# Patient Record
Sex: FEMALE | Race: WHITE
[De-identification: names, ages, dates, MRNs, and addresses within clinical notes are randomized per-mention and may not be internally consistent; named-entity substitution may affect disease eponyms.]

---

## 2023-05-24 ENCOUNTER — HOSPITAL ENCOUNTER (INPATIENT)
Dept: HOSPITAL 97 - ER | Age: 63
LOS: 2 days | Discharge: HOME | DRG: 379 | End: 2023-05-26
Attending: HOSPITALIST | Admitting: HOSPITALIST
Payer: COMMERCIAL

## 2023-05-24 VITALS — BODY MASS INDEX: 16.9 KG/M2

## 2023-05-24 DIAGNOSIS — K27.4: ICD-10-CM

## 2023-05-24 DIAGNOSIS — J44.9: ICD-10-CM

## 2023-05-24 DIAGNOSIS — K29.01: Primary | ICD-10-CM

## 2023-05-24 DIAGNOSIS — Z79.899: ICD-10-CM

## 2023-05-24 DIAGNOSIS — D12.4: ICD-10-CM

## 2023-05-24 DIAGNOSIS — F17.210: ICD-10-CM

## 2023-05-24 DIAGNOSIS — Z90.710: ICD-10-CM

## 2023-05-24 DIAGNOSIS — Z88.5: ICD-10-CM

## 2023-05-24 DIAGNOSIS — M19.90: ICD-10-CM

## 2023-05-24 LAB
ALBUMIN SERPL BCP-MCNC: 3.7 G/DL (ref 3.4–5)
ALP SERPL-CCNC: 61 U/L (ref 45–117)
ALT SERPL W P-5'-P-CCNC: 23 U/L (ref 13–56)
AST SERPL W P-5'-P-CCNC: 15 U/L (ref 15–37)
BUN BLD-MCNC: 18 MG/DL (ref 7–18)
GLUCOSE SERPLBLD-MCNC: 94 MG/DL (ref 74–106)
HCT VFR BLD CALC: 38.9 % (ref 36–45)
LIPASE SERPL-CCNC: 77 U/L (ref 13–75)
LYMPHOCYTES # SPEC AUTO: 0.9 K/UL (ref 0.7–4.9)
MCV RBC: 93.2 FL (ref 80–100)
PMV BLD: 5.9 FL (ref 7.6–11.3)
POTASSIUM SERPL-SCNC: 3.9 MEQ/L (ref 3.5–5.1)
RBC # BLD: 4.17 M/UL (ref 3.86–4.86)

## 2023-05-24 PROCEDURE — 93005 ELECTROCARDIOGRAM TRACING: CPT

## 2023-05-24 PROCEDURE — 88305 TISSUE EXAM BY PATHOLOGIST: CPT

## 2023-05-24 PROCEDURE — 85025 COMPLETE CBC W/AUTO DIFF WBC: CPT

## 2023-05-24 PROCEDURE — 96361 HYDRATE IV INFUSION ADD-ON: CPT

## 2023-05-24 PROCEDURE — 74177 CT ABD & PELVIS W/CONTRAST: CPT

## 2023-05-24 PROCEDURE — 96374 THER/PROPH/DIAG INJ IV PUSH: CPT

## 2023-05-24 PROCEDURE — 80048 BASIC METABOLIC PNL TOTAL CA: CPT

## 2023-05-24 PROCEDURE — 86900 BLOOD TYPING SEROLOGIC ABO: CPT

## 2023-05-24 PROCEDURE — 99284 EMERGENCY DEPT VISIT MOD MDM: CPT

## 2023-05-24 PROCEDURE — 74250 X-RAY XM SM INT 1CNTRST STD: CPT

## 2023-05-24 PROCEDURE — 85018 HEMOGLOBIN: CPT

## 2023-05-24 PROCEDURE — 88312 SPECIAL STAINS GROUP 1: CPT

## 2023-05-24 PROCEDURE — 80053 COMPREHEN METABOLIC PANEL: CPT

## 2023-05-24 PROCEDURE — 36415 COLL VENOUS BLD VENIPUNCTURE: CPT

## 2023-05-24 PROCEDURE — 86850 RBC ANTIBODY SCREEN: CPT

## 2023-05-24 PROCEDURE — 86901 BLOOD TYPING SEROLOGIC RH(D): CPT

## 2023-05-24 PROCEDURE — 96375 TX/PRO/DX INJ NEW DRUG ADDON: CPT

## 2023-05-24 PROCEDURE — 83690 ASSAY OF LIPASE: CPT

## 2023-05-24 RX ADMIN — Medication SCH: at 21:56

## 2023-05-24 NOTE — XMS REPORT
Continuity of Care Document

                             Created on:May 24, 2023



Patient:JHONNY TY

Sex:Female

:1960

External Reference #:[id number]





Demographics







                          Address                   [address] [place] [address]

 

                          Home Phone                [phone]

 

                          Work Phone                [phone]

 

                          Mobile Phone              [phone]

 

                          Email Address             [email or web address]

 

                          Preferred Language        English

 

                          Marital Status            Unknown

 

                          Druze Affiliation     Unknown

 

                          Race                      Unknown

 

                          Additional Race(s)        Unavailable



                                                    White

 

                          Ethnic Group              Unknown









Author







                          Organization              [facility]

t

 

                          Address                   [address] [place] [address] [address] [place] [address]

 

                          Phone                     [phone]









Support







                Name            Relationship    Address         Phone

 

                [place]  Spouse          Unavailable     [phone]









Care Team Providers







                    Name                Role                Phone

 

                    Asked, No Pcp       Primary Care Physician Unavailable

 

                    Dionicio CRAWLEY, Sergio HOLLIS Attending Clinician [phone]

[phone]

 

                    Serjio CRAWLEY, Oswaldomarie Olmedo Attending Clinician [phone]

 

                    Al CRAWLEY, Liz Gaona Attending Clinician [phone]

 

                    Doctor Unassigned, No Name Attending Clinician Unavailable

 

                    YENI FIREDMAN      Attending Clinician Unavailable

 

                    Neurology           Attending Clinician Unavailable

 

                    JUANITO LERNER Attending Clinician Unavailable

 

                    Juanito Lerner MD Attending Clinician [phone]

 

                    Lab, Adc Fam Pob I  Attending Clinician Unavailable

 

                    Sandy Greco  Attending Clinician [phone]

 

                    SANDY CIFUENTES      Attending Clinician Unavailable

 

                    OSWALDO BASSETT      Admitting Clinician Unavailable









Payers







           Payer Name Policy Type Policy Number Effective Date Expiration Date S

ource







Problems







       Condition Condition Condition Status Onset  Resolution Last   Treating Co

mments 

Source



       Name   Details Category        Date   Date   Treatment Clinician        



                                                 Date                 

 

       Solitary Solitary Disease Active                              Metho

di



       pulmonary pulmonary               



       nodule nodule               00:00:                             Hosp[facility]



                                   00                                 l

 

       Pulmonary Pulmonary Disease Active                              Met

hodi



       emphysema emphysema               



                                   00:00:                             Hospita



                                   00                                 l

 

       Depression Depression Disease Active                              M

ethodi



                                   



                                   00:00:                             Hospita



                                   00                                 l

 

       Arthritis Arthritis Disease Active                              Met

hodi



                                                                  st



                                   00:00:                             Hospita



                                   00                                 l

 

       Anxiety Anxiety Disease Active                              [facility]



                                   6-22                               st



                                   00:00:                             Hospita



                                   00                                 l

 

       Neuropathy Neuropathy Disease Active                              U

nivers



       of both of both               9-16                               ity of



       feet   feet                 00:00:                             [address]

 

       Neuropathy Neuropathy Disease Active                              M

ethodi



       of both of both               9-16                               st



       feet   feet                 00:00:                             Hospita



                                   00                                 l

 

       COPD   COPD   Disease Active                                    [facility]



       (chronic (chronic                                                  [facility]



       obstructiv obstructiv                                                  [facility]

[facility]



       e      e                                                       [facility]



       pulmonary pulmonary                                                  [facility]

ch



       disease) disease)                                                  

 

       Fever, Fever, Diagnosis Active                                    Common



       unspecifie unspecifie                                                  Sp

juan



       d fever d fever                                                  - [facility]



       cause  cause                                                   [facility]

 

       Pulmonary Pulmonary Diagnosis Active                                    C

ommon



       emphysema, emphysema,                                                  Sp

juan



       unspecifie unspecifie                                                  - 

[facility]



       d      d                                                       St



       emphysema emphysema                                                  [facility]

s



       type   type                                                    Medical



                                                                      Center

 

       Cough  Cough  Diagnosis Active                                    Common



                                                                      Spirit



                                                                      - [facility]



                                                                      [facility]







Allergies, Adverse Reactions, Alerts







       Allergy Allergy Status Severity Reaction(s) Onset  Inactive Treating Comm

ents 

Source



       Name   Type                        Date   Date   Clinician        

 

       NO KNOWN Drug   Active                                           [facility]



       ALLERGIE Class                                                   ity of



       S                                                              [facility]







Social History







           Social Habit Start Date Stop Date  Quantity   Comments   Source

 

           Exposure to                       Yes                   [facility]



           SARS-CoV-2 (event)                                             [facility]

 

           History SDOH                                             [facility]

f



           Alcohol Std Drinks                                             [place] 

[facility]



                                                                  [facility]

 

           History [facility]                                             [facility]

f



           Alcohol Binge                                             [place] [facility]

al



                                                                  [facility]

 

           History of tobacco                       Smokes tobacco            Me

[facility]



           use                              daily                 Hospital

 

           Gender identity                                             [facility]



                                                                  [facility]

 

           Sexual orientation                                             [facility]

ist



                                                                  [facility]ospital

 

           Alcohol intake 2022 Ex-drinker            [facility]



                      00:00:00   00:00:00   (finding)             Hospital

 

           History of Social 2022                       [facility]

st



           function   00:00:00   00:00:00                         Hospital

 

           Cigarettes smoked 2022                       [facility]

st



           current (pack per 00:00:00   00:00:00                         Hospita

l



           day) - Reported                                             

 

           Cigarette  2022                       [facility]



           pack-years 00:00:00   00:00:00                         Hospital

 

           Tobacco use and 2022 Smokeless             [facility]



           exposure   00:00:00   00:00:00   tobacco non-user            Hospital

 

           History SDOH 2019 1                     [facility]

f



           Alcohol Frequency 00:00:00   00:00:00                         [place] [facility]

[facility]



                                                                  [facility]

 

           Sex Assigned At 1960                       [facility]



           Birth      00:00:00   00:00:00                         Hospital









                Smoking Status  Start Date      Stop Date       Source

 

                Smokes tobacco daily 2022 00:00:00                 [facility]

 

                Unknown if ever smoked                                 [facility]







Medications







       Ordered Filled Start  Stop   Current Ordering Indication Dosage Frequency

 Signature

                    Comments            Components          Source



     Medication Medication Date Date Medication? Clinician                (SIG) 

          



     Name Name                                                   

 

     levoFLOXaci      2022- No             750mg QD   Take 1           Me

thodi



     n         6-26 07-04                          tablet           st



     (Levaquin)      00:00: 04:59                          (750 mg           Hos

claudine



     750 MG      00   :00                           total) by           l



     tablet                                         mouth           



                                                  daily for           



                                                  7 days.           

 

     Trelegy            Yes            1{each} QD   Inhale 1           Met

hodi



     Ellipta      4-23                               each           st



     100-62.5-25      00:00:                               daily.           Hosp

tanmay



     mcg blister      00                                                l



     with device                                                        



     powder for                                                        



     inhalation                                                        

 

     azithromyci      2022- No             250mg QD   Take 250           

[facility]



     n         3-30 06-26                          mg by           st



     (ZITHROMAX)      00:00: 00:00                          mouth           Hosp

tanmay



     250 MG      00   :00                           daily.           l



     tablet                                                        

 

     albuterol            Yes            1{puff}      Inhale 1           M

ethodi



     (PROAIR      3-18                               puff as           st



     HFA) 90      00:00:                               needed.           Hospita



     mcg/actuati      00                                                l



     on inhaler                                                        

 

     ALBUTEROL            Yes       [id number]           INHALE TWO         

  Univers



     90        2-13                               (2) PUFFS           ity of



     mcg/actuati      00:00:                               BY MOUTH           [facility]

[facility]



     on inhaler      00                                 EVERY 4           Medica

l



                                                  HOURS AS           Branch



                                                  NEEDED FOR           



                                                  WHEEZING           



                                                  OR             



                                                  SHORTNESS           



                                                  OF BREATH.           

 

     ALBUTEROL            Yes       [id number]           INHALE TWO         

  [facility]



     90        2-13                               (2) PUFFS           ity of



     mcg/actuati      00:00:                               BY MOUTH           Te

[facility]



     on inhaler      00                                 EVERY 4           Medica

l



                                                  HOURS AS           Branch



                                                  NEEDED FOR           



                                                  WHEEZING           



                                                  OR             



                                                  SHORTNESS           



                                                  OF BREATH.           

 

     ALBUTEROL            Yes       [id number]           INHALE TWO         

  Univers



     90        2-13                               (2) PUFFS           ity of



     mcg/actuati      00:00:                               BY MOUTH           Te

[facility]



     on inhaler      00                                 EVERY 4           Medica

l



                                                  HOURS AS           Branch



                                                  NEEDED FOR           



                                                  WHEEZING           



                                                  OR             



                                                  SHORTNESS           



                                                  OF BREATH.           

 

     ALBUTEROL      2020-0      Yes       [id number]           INHALE TWO         

  Univers



     90        2-13                               (2) PUFFS           ity of



     mcg/actuati      00:00:                               BY MOUTH           Te

[facility]



     on inhaler      00                                 EVERY 4           Medica

l



                                                  HOURS AS           Branch



                                                  NEEDED FOR           



                                                  WHEEZING           



                                                  OR             



                                                  SHORTNESS           



                                                  OF BREATH.           

 

     ALBUTEROL            Yes       [id number]           INHALE TWO         

  [facility]



     90        2-13                               (2) PUFFS           ity of



     mcg/actuati      00:00:                               BY MOUTH           Te

[facility]



     on inhaler      00                                 EVERY 4           Medica

l



                                                  HOURS AS           Branch



                                                  NEEDED FOR           



                                                  WHEEZING           



                                                  OR             



                                                  SHORTNESS           



                                                  OF BREATH.           

 

     ALBUTEROL            Yes       [id number]           INHALE TWO         

  Univers



     90        2-13                               (2) PUFFS           ity of



     mcg/actuati      00:00:                               BY MOUTH           Te

[facility]



     on inhaler      00                                 EVERY 4           Medica

l



                                                  HOURS AS           Branch



                                                  NEEDED FOR           



                                                  WHEEZING           



                                                  OR             



                                                  SHORTNESS           



                                                  OF BREATH.           

 

     albuterol-i      2019      Yes            1{puff}      Inhale 1          

 [facility]



     pratropium      1-19                               Puff 4           ity of



           20:45:                               (four)           [place]



     mcg/actuati      33                                 times           Medical



     on inhaler                                         daily.           Branch

 

     albuterol-i      2019      Yes            1{puff}      Inhale 1          

 [facility]



     pratropium      1-19                               Puff 4           ity of



           20:45:                               (four)           [place]



     mcg/actuati      33                                 times           Medical



     on inhaler                                         daily.           Branch

 

     albuterol-i      2019      Yes            1{puff}      Inhale 1          

 [facility]



     pratropium      1-19                               Puff 4           ity of



           20:45:                               (four)           [place]



     mcg/actuati      33                                 times           Medical



     on inhaler                                         daily.           Branch

 

     albuterol-i      2019      Yes            1{puff}      Inhale 1          

 [facility]



     pratropium      1-19                               Puff 4           ity of



           20:45:                               (four)           [place]



     mcg/actuati      33                                 times           Medical



     on inhaler                                         daily.           Branch

 

     albuterol-i      2019      Yes            1{puff}      Inhale 1          

 [facility]



     pratropium      1-19                               Puff 4           ity of



           20:45:                               (four)           [place]



     mcg/actuati      33                                 times           Medical



     on inhaler                                         daily.           Branch

 

     albuterol-i      2019      Yes            1{puff}      Inhale 1          

 [facility]



     pratropium      1-19                               Puff 4           ity of



           20:45:                               (four)           [place]



     mcg/actuati      33                                 times           Medical



     on inhaler                                         daily.           Branch

 

     venlafaxine            Yes       [id number] 37.5mg      Take 1        

   [facility]



     XR (EFFEXOR      9-24                               capsule by           it

y of



     XR) 37.5 mg      00:00:                               mouth           [place]



     24 hr      00                                 daily with           Medical



     capsule                                         breakfast.           Branch



                                                  STOP           



                                                  CITALOPRAM           



                                                  .              

 

     venlafaxine            Yes       [id number] 37.5mg      Take 1        

   [facility]



     XR (EFFEXOR      9-24                               capsule by           it

y of



     XR) 37.5 mg      00:00:                               mouth           [place]



     24 hr      00                                 daily with           Medical



     capsule                                         breakfast.           Branch



                                                  STOP           



                                                  CITALOPRAM           



                                                  .              

 

     venlafaxine            Yes       [id number] 37.5mg      Take 1        

   [facility]



     XR (EFFEXOR      9-24                               capsule by           it

y of



     XR) 37.5 mg      00:00:                               mouth           [place]



     24 hr      00                                 daily with           Medical



     capsule                                         breakfast.           Branch



                                                  STOP           



                                                  CITALOPRAM           



                                                  .              

 

     venlafaxine            Yes       [id number] 37.5mg      Take 1        

   [facility]



     XR (EFFEXOR      9-24                               capsule by           it

y of



     XR) 37.5 mg      00:00:                               mouth           [place]



     24 hr      00                                 daily with           Medical



     capsule                                         breakfast.           Branch



                                                  STOP           



                                                  CITALOPRAM           



                                                  .              

 

     venlafaxine            Yes       [id number] 37.5mg      Take 1        

   [facility]



     XR (EFFEXOR      9-24                               capsule by           it

y of



     XR) 37.5 mg      00:00:                               mouth           [place]



     24 hr      00                                 daily with           Medical



     capsule                                         breakfast.           Branch



                                                  STOP           



                                                  CITALOPRAM           



                                                  .              

 

     venlafaxine            Yes       [id number] 37.5mg      Take 1        

   [facility]



     XR (EFFEXOR      9-24                               capsule by           it

y of



     XR) 37.5 mg      00:00:                               mouth           [place]



     24 hr      00                                 daily with           Medical



     capsule                                         breakfast.           Branch



                                                  STOP           



                                                  CITALOPRAM           



                                                  .              

 

     albuterol       2020- No        [id number] 2{puff}      Inhale 2       

    Univers



     90        924 02-13                          Puffs           ity of



     mcg/actuati      00:00: 00:00                          every 4           Te

[facility]



     on inhaler      00   :00                           (four)           Medical



                                                  hours as           Branch



                                                  needed for           



                                                  Wheezing           



                                                  or             



                                                  Shortness           



                                                  of Breath.           

 

     lidocaine            Yes       [id number] 5mL       Take 5 mL         

  Univers



     2% viscous      9-21                               by mouth           [facility]

f



     (LIDOCAINE      00:00:                               every 4           [facility]

[facility]



     VISCOUS) 2      00                                 (four)           [facility]



     % solution                                         hours as           [facility]

[facility]



                                                  needed for           



                                                  Local           



                                                  anesthesia           



                                                  .              

 

     lidocaine            Yes       [id number] 5mL       Take 5 mL         

  [facility]



     2% viscous      9-21                               by mouth           [facility]

f



     (LIDOCAINE      00:00:                               every 4           [facility]

[facility]



     VISCOUS) 2      00                                 (four)           [facility]



     % solution                                         hours as           [facility]

h



                                                  needed for           



                                                  Local           



                                                  anesthesia           



                                                  .              

 

     lidocaine      2019-0      Yes       [id number] 5mL       Take 5 mL         

  [facility]



     2% viscous      9-21                               by mouth           [facility]

f



     (LIDOCAINE      00:00:                               every 4           [facility]

[facility]



     VISCOUS) 2      00                                 (four)           [facility]



     % solution                                         hours as           [facility]

h



                                                  needed for           



                                                  Local           



                                                  anesthesia           



                                                  .              

 

     lidocaine      2019-0      Yes       [id number] 5mL       Take 5 mL         

  [facility]



     2% viscous      9-21                               by mouth           [facility]

f



     (LIDOCAINE      00:00:                               every 4           [facility]

[facility]



     VISCOUS) 2      00                                 (four)           [facility]



     % solution                                         hours as           [facility]

h



                                                  needed for           



                                                  Local           



                                                  anesthesia           



                                                  .              

 

     lidocaine      2019-0      Yes       [id number] 5mL       Take 5 mL         

  [facility]



     2% viscous      9-21                               by mouth           [facility]

f



     (LIDOCAINE      00:00:                               every 4           [facility]

[facility]



     VISCOUS) 2      00                                 (four)           [facility]



     % solution                                         hours as           [facility]

h



                                                  needed for           



                                                  Local           



                                                  anesthesia           



                                                  .              

 

     lidocaine      -0      Yes       [id number] 5mL       Take 5 mL         

  [facility]



     2% viscous      9-21                               by mouth           [facility]

f



     (LIDOCAINE      00:00:                               every 4           [facility]

[facility]



     VISCOUS) 2      00                                 (four)           [facility]



     % solution                                         hours as           [facility]

h



                                                  needed for           



                                                  Local           



                                                  anesthesia           



                                                  .              

 

     gabapentin      2019-0      Yes       [id number] 100mg      Take 1          

 [facility]



     100 mg      9-05                               capsule by           [facility]



     capsule      00:00:                               mouth 3           [place]



               00                                 (three)           [facility]



                                                  times           [facility]



                                                  daily.           

 

     gabapentin      2019-0      Yes                                     [facility]



     100 mg      9-05                                              [facility]



     capsule      00:00:                                              [place]



                                                               [facility]



                                                                 [facility]

 

     citalopram      2019-0      Yes                                     [facility]



     10 mg      9-05                                              [facility]



     tablet      00:00:                                              [place]



               00                                                [facility]



                                                                 [facility]

 

     gabapentin      2019-0      Yes       [id number] 100mg      Take 1          

 [facility]



     100 mg      9-05                               capsule by           [facility]



     capsule      00:00:                               mouth 3           [place]



               00                                 (three)           [facility]



                                                  times           [facility]



                                                  daily.           

 

     gabapentin      2019-0      Yes                                     [facility]



     100 mg      9-05                                              ity [facility]



     capsule      00:00:                                              [place]



                                                               [facility]



                                                                 [facility]

 

     citalopram      2019-0      Yes                                     [facility]



     10 mg      9-05                                              ity [facility]



     tablet      00:00:                                              [place]



               00                                                [facility]



                                                                 [facility]

 

     gabapentin      2019-0      Yes       [id number] 100mg      Take 1          

 [facility]



     100 mg      9-05                               capsule by           [facility]



     capsule      00:00:                               mouth 3           [place]



               00                                 (three)           [facility]



                                                  times           [facility]



                                                  daily.           

 

     gabapentin      2019-0      Yes                                     [facility]



     100 mg      9-05                                              ity [facility]



     capsule      00:00:                                              [place]



               00                                                [facility]



                                                                 [facility]

 

     citalopram      2019-0      Yes                                     [facility]



     10 mg      9-05                                              ity [facility]



     tablet      00:00:                                              [place]



               00                                                [facility]



                                                                 [facility]

 

     gabapentin      2019-0      Yes       [id number] 100mg      Take 1          

 [facility]



     100 mg      9-05                               capsule by           [facility]



     capsule      00:00:                               mouth 3           [place]



               00                                 (three)           [facility]



                                                  times           [facility]



                                                  daily.           

 

     citalopram      2019-0      Yes                                     [facility]



     10 mg      9-05                                              [facility]



     tablet      00:00:                                              [place]



               00                                                [facility]



                                                                 [facility]

 

     gabapentin      2019-0      Yes       [id number] 100mg      Take 1          

 [facility]



     100 mg      9-05                               capsule by           [facility]



     capsule      00:00:                               mouth 3           [place]



               00                                 (three)           [facility]



                                                  times           [facility]



                                                  daily.           

 

     citalopram      2019-0      Yes                                     [facility]



     10 mg      9-05                                              ity [facility]



     tablet      00:00:                                              [place]



               00                                                [facility]



                                                                 [facility]

 

     gabapentin      2019-0      Yes       [id number] 100mg      Take 1          

 [facility]



     100 mg      9-05                               capsule by           [facility]



     capsule      00:00:                               mouth 3           [place]



               00                                 (three)           [facility]



                                                  times           [facility]



                                                  daily.           

 

     citalopram      2019-0      Yes                                     [facility]



     10 mg      9-05                                              [facility]



     tablet      00:00:                                              [place]



               00                                                [facility]



                                                                 [facility]

 

     gabapentin      2019-0      Yes       [id number] 100mg      Take 1          

 [facility]



     100 mg      9-05                               capsule by           [facility]



     capsule      00:00:                               mouth 3           [place]



               00                                 (three)           [facility]



                                                  times           [facility]



                                                  daily.           

 

     citalopram      2019-0      Yes       [id number] 10mg      Take 1           

[facility]



     10 mg      9-05                               tablet by           [facility]



     tablet      00:00:                               mouth           [place]



               00                                 daily.           [facility]



                                                                 [facility]

 

     gabapentin      2019-0      Yes       [id number] 100mg      Take 1          

 [facility]



     100 mg      9-05                               capsule by           [facility]



     capsule      00:00:                               mouth 3           [place]



               00                                 (three)           [facility]



                                                  times           [facility]



                                                  daily.           

 

     citalopram      2019-0      Yes       [id number] 10mg      Take 1           

[facility]



     10 mg      9-05                               tablet by           [facility]



     tablet      00:00:                               mouth           [place]



               00                                 daily.           [facility]



                                                                 [facility]

 

     gabapentin      2019-0      Yes       [id number] 100mg      Take 1          

 [facility]



     100 mg      9-05                               capsule by           [facility]



     capsule      00:00:                               mouth 3           [place]



               00                                 (three)           [facility]



                                                  times           [facility]



                                                  daily.           

 

     citalopram      2019-0      Yes       [id number] 10mg      Take 1           

[facility]



     10 mg      9-05                               tablet by           [facility]



     tablet      00:00:                               mouth           [place]



               00                                 daily.           [facility]



                                                                 [facility]

 

     gabapentin      2019-0      Yes       [id number] 100mg      Take 1          

 [facility]



     100 mg      9-05                               capsule by           [facility]



     capsule      00:00:                               mouth 3           [place]



               00                                 (three)           [facility]



                                                  times           [facility]



                                                  daily.           

 

     citalopram      2019-      Yes       [id number] 10mg      Take 1           

[facility]



     10 mg      9-05                               tablet by           [facility]



     tablet      00:00:                               mouth           [place]



               00                                 daily.           [facility]



                                                                 [facility]

 

     gabapentin      - 2020- No                                      [facility]

[facility]



     100 mg      9-05 11-10                                         [facility]



     capsule      00:00: 00:00                                         [place]



               00   :00                                          [facility]



                                                                 [facility]

 

     STIOLTO      2019-      Yes                      INHALE TWO           [facility]

ers



     RESPIMAT      6-21                               (2)            [facility]



     2.5-2.5      00:00:                               PUFF(S) BY           [facility]

[facility]



     mcg/actuati      00                                 MOUTH           [facility]



     on Mist                                         EVERY 24           [facility]



                                                  HOURS.           

 

     STIOLTO            Yes                      INHALE TWO           [facility]

ers



     RESPIMAT      6-21                               (2)            [facility]



     2.5-2.5      00:00:                               PUFF(S) BY           [facility]

[facility]



     mcg/actuati      00                                 MOUTH           [facility]



     on Mist                                         EVERY 24           [facility]



                                                  HOURS.           

 

     STIOLTO            Yes                      INHALE TWO           [facility]

ers



     RESPIMAT      6-21                               (2)            [facility]



     2.5-2.5      00:00:                               PUFF(S) BY           [facility]

[facility]



     mcg/actuati      00                                 MOUTH           [facility]



     on Mist                                         EVERY 24           [facility]



                                                  HOURS.           

 

     STIOLTO            Yes                      INHALE TWO           [facility]

ers



     RESPIMAT      6-21                               (2)            [facility]



     2.5-2.5      00:00:                               PUFF(S) BY           [facility]

[facility]



     mcg/actuati      00                                 MOUTH           [facility]



     on Mist                                         EVERY 24           [facility]



                                                  HOURS.           

 

     STIOLTO            Yes                      INHALE TWO           [facility]

ers



     RESPIMAT      6-21                               (2)            [facility]



     2.5-2.5      00:00:                               PUFF(S) BY           [facility]

[facility]



     mcg/actuati      00                                 MOUTH           [facility]



     on Mist                                         EVERY 24           [facility]



                                                  HOURS.           

 

     STIOLTO            Yes                      INHALE TWO           [facility]

ers



     RESPIMAT      6-21                               (2)            [facility]



     2.5-2.5      00:00:                               PUFF(S) BY           [facility]

[facility]



     mcg/actuati      00                                 MOUTH           [facility]



     on Mist                                         EVERY 24           [facility]



                                                  HOURS.           

 

     STIOLTO            Yes                      INHALE TWO           [facility]

ers



     RESPIMAT      6-21                               (2)            [facility]



     2.5-2.5      00:00:                               PUFF(S) BY           [facility]

[facility]



     mcg/actuati      00                                 MOUTH           Medical



     on Mist                                         EVERY 24           [facility]



                                                  HOURS.           

 

     STIOLTO            Yes                      INHALE TWO           [facility]

ers



     RESPIMAT      6-21                               (2)            ity [facility]



     2.5-2.5      00:00:                               PUFF(S) BY           [facility]

[facility]



     mcg/actuati      00                                 MOUTH           Medical



     on Mist                                         EVERY 24           [facility]



                                                  HOURS.           

 

     STIOLTO            Yes                      INHALE TWO           Un[facility]

ers



     RESPIMAT      6-21                               (2)            ity [facility]



     2.5-2.5      00:00:                               PUFF(S) BY           [facility]

[facility]



     mcg/actuati      00                                 MOUTH           Medical



     on Mist                                         EVERY 24           [facility]



                                                  HOURS.           

 

     STIOLTO            Yes                      INHALE TWO           Un[facility]

ers



     RESPIMAT      6-21                               (2)            ity [facility]



     2.5-2.5      00:00:                               PUFF(S) BY           [facility]

s



     mcg/actuati      00                                 MOUTH           Medical



     on Mist                                         EVERY 24           [facility]



                                                  HOURS.           

 

     Spiriva Spiriva 2019- No   [facility]                2 puffs        

   [facility]



     Respimat Respimat 1-10 05-10                                         [facility]



               00:00: 00:00                                         - [facility]



               00   :00                                          [facility]

 

     PredniSONE PredniSONE  2019- No   [facility]                1 tablet 

          [facility]



               1-10 01-20                                         [facility]



               00:00: 00:00                                         - [facility]



               00   :00                                          [facility]

 

     Azithromyci Azithromyci 2019- No   [facility]                2 table

ts           [facility]



     n    n    1-10 01-15                          on the           [facility]



               00:00: 00:00                          first day,           - [facility]



               00   :00                           then 1           [facility]



                                                  tablet           [facility]



                                                  daily for           [facility]



                                                  4 days           Center

 

     albuterol            Yes            2{puff}      Inhale 2           U

nivers



     90        2-17                               Puffs           [facility]



     mcg/actuati      00:00:                               every 4           [facility]

as



     on inhaler      00                                 (four)           [facility]



                                                  hours as           [facility]



                                                  needed for           



                                                  Wheezing           



                                                  or             



                                                  Shortness           



                                                  of Breath.           

 

     oxymetazoli            Yes            1{spray      Use 1           [facility]

[facility]



     ne 0.05 %      2-17                     }         Spray in           [facility]



     nasal spray      00:00:                               each           [place]



               00                                 nostril 2           [facility]



                                                  (two)           [facility]



                                                  times           



                                                  daily.           



                                                  DISCARD           



                                                  AFTER 3           



                                                  DAYS. USE           



                                                  MAY BE           



                                                  ADDICTIVE           



                                                  AFTER 4           



                                                  DAYS USE.           

 

     albuterol            Yes            2{puff}      Inhale 2           U

nivers



     90        2-17                               Puffs           [facility]



     mcg/actuati      00:00:                               every 4           [facility]

as



     on inhaler      00                                 (four)           [facility]



                                                  hours as           [facility]



                                                  needed for           



                                                  Wheezing           



                                                  or             



                                                  Shortness           



                                                  of Breath.           

 

     sod             Yes            1{bottl      Use 1           [facility]



     chlor-bicar      2-17                     e}        Bottle in           ity

 of



     b-squeez      00:00:                               each           [place]



     bottle      00                                 nostril 2           Medical



     (NEILMED                                         (two)           [facility]



     SINUS RINSE                                         times           



     COMPLETE)                                         daily. Use           



     pkdv                                         in hot           



                                                  shower 1           



                                                  hour           



                                                  before           



                                                  bedtime           

 

     albuterol            Yes            2{puff}      Inhale 2           U

nivers



     90        2-17                               Puffs           ity of



     mcg/actuati      00:00:                               every 4           Miguel

as



     on inhaler      00                                 (four)           Medical



                                                  hours as           Branch



                                                  needed for           



                                                  Wheezing           



                                                  or             



                                                  Shortness           



                                                  of Breath.           

 

     albuterol            Yes            2{puff}      Inhale 2           U

nivers



     90        2-17                               Puffs           ity of



     mcg/actuati      00:00:                               every 4           Miguel

as



     on inhaler      00                                 (four)           Medical



                                                  hours as           Branch



                                                  needed for           



                                                  Wheezing           



                                                  or             



                                                  Shortness           



                                                  of Breath.           

 

     albuterol            Yes            2{puff}      Inhale 2           U

nivers



     90        2-17                               Puffs           ity of



     mcg/actuati      00:00:                               every 4           Miguel

as



     on inhaler      00                                 (four)           Medical



                                                  hours as           Branch



                                                  needed for           



                                                  Wheezing           



                                                  or             



                                                  Shortness           



                                                  of Breath.           

 

     oxymetazoli       2019- No             1{spray      Use 1           U

nivers



     ne 0.05 %      17 -05                }         Spray in           ity o

f



     nasal spray      00:00: 00:00                          each           [place]



               00   :00                           nostril 2           Medical



                                                  (two)           Branch



                                                  times           



                                                  daily.           



                                                  DISCARD           



                                                  AFTER 3           



                                                  DAYS. USE           



                                                  MAY BE           



                                                  ADDICTIVE           



                                                  AFTER 4           



                                                  DAYS USE.           

 

     sod        2019- No             1{bottl      Use 1           [facility]



     chlor-bicar      2-17 09-05                e}        Bottle in           it

y of



     b-squeez      00:00: 00:00                          each           [place]



     bottle      00   :00                           nostril 2           Medical



     (NEILMED                                         (two)           Branch



     SINUS RINSE                                         times           



     COMPLETE)                                         daily. Use           



     pkdv                                         in hot           



                                                  shower 1           



                                                  hour           



                                                  before           



                                                  bedtime           

 

     oxymetazoli       2019- No             1{spray      Use 1           U

nivers



     ne 0.05 %      217 09-05                }         Spray in           ity o

f



     nasal spray      00:00: 00:00                          each           [place]



               00   :00                           nostril 2           Medical



                                                  (two)           Branch



                                                  times           



                                                  daily.           



                                                  DISCARD           



                                                  AFTER 3           



                                                  DAYS. USE           



                                                  MAY BE           



                                                  ADDICTIVE           



                                                  AFTER 4           



                                                  DAYS USE.           

 

     sod        2019- No             1{bottl      Use 1           [facility]



     chlor-domingo      2-17                 e}        Bottle in           it

y of



     b-squeez      00:00: 00:00                          each           [place]



     bottle      00   :00                           nostril 2           Medical



     (NEILMED                                         (two)           Branch



     SINUS RINSE                                         times           



     COMPLETE)                                         daily. Use           



     pkdv                                         in hot           



                                                  shower 1           



                                                  hour           



                                                  before           



                                                  bedtime           

 

     ProAir HFA ProAir HFA           Yes  Barbara Brandywine                2 puffs as  

         Common



                                                  needed           [facility]



                                                                 - [facility]



                                                                 [facility]







Immunizations







           Ordered    Filled Immunization Date       Status     Comments   Sourc

e



           Immunization Name Name                                        

 

           SARS-COV-2 COVID-19            2021 Completed             Un[facility]

rsity of



           MODERNA VACCINE            00:00:00                         [place] [facility]

ical



                                                                  [facility]anch

 

           MODERNA COVID-19            2021 Completed             [facility]

t



           MRNA VACCINATION            00:00:00                         [facility]

 

           MODERNA COVID-19            2021 Completed             [facility]

t



           MRNA VACCINATION            00:00:00                         Hospital







Vital Signs







             Vital Name   Observation Time Observation Value Comments     Source

 

             Systolic blood 2019 14:21:00 108 mm[Hg]                Univer

sity of



             [facility]

 

             Diastolic blood 2019 14:21:00 62 mm[Hg]                 Unive

rsity of



             [facility]

 

             Heart rate   2019 14:21:00 73 /min                   [facility]

 

             Body temperature 2019 14:21:00 36.06 Danielle                 [facility]

 

             Body height  2019 14:21:00 157.5 cm                  [facility]

 

             Body weight  2019 14:21:00 45.904 kg                 [facility]

 

             BMI          2019 14:21:00 18.51 kg/m2               [facility]

 

             Systolic blood 2022 19:43:00 105 mm[Hg]                [facility]



             pressure                                            

 

             Diastolic blood 2022 19:43:00 52 mm[Hg]                 [facility]

dist [facility]



             pressure                                            

 

             Heart rate   2022 19:43:00 147 /min                  [facility]

 

             Oxygen saturation in 2022 19:43:00 91 /min                   

[facility]



             Arterial blood by                                        



             Pulse oximetry                                        

 

             Respiratory rate 2022 19:20:00 27 /min                   [facility]

 

             Body temperature 2022 18:56:00 36.61 Danielle                 [facility]

 

             Body height  2022 15:28:00 157.5 cm                  [facility]

 

             Body weight  2022 15:28:00 43.863 kg                 [facility]

 

             BMI          2022 15:28:00 17.69 kg/m2               [facility]







Procedures







                Procedure       Date / Time     Performing Clinician Source



                                Performed                       

 

                PET CT SKULL BASE TO MID 2022 18:16:48 [facility]



                THIGH                           A.              

 

                POC GLUCOSE     2022 16:46:00 [facility]



                                                A.              

 

                SURGICAL PATHOLOGY REQUEST 2022 20:43:00 [facility]          

 

                XR CHEST 1 VW PORTABLE 2022 19:10:13 [facility]          

 

                [facility]        [facility]



                (NON-GYNECOLOGICAL)                 [facility]          



                REQUEST                                         

 

                RESPIRATORY CULTURE 2022 19:00:00 [facility]          

 

                AFB CULTURE     2022 19:00:00 [facility]

spital



                                                [facility]          

 

                FUNGUS CULTURE  2022 19:00:00 [facility]

spital



                                                [facility]          

 

                RESPIRATORY PATHOGEN PANEL 2022 19:00:00 [facility]



                WITH COVID-19 RT-PCR                 [facility]          

 

                GRAM STAIN      2022 19:00:00 [facility][1] Ho

spital



                                                [place]          

 

                AFB STAIN       2022 19:00:00 [facility]

spital



                                                [place]          

 

                CYTOMEGALOVIRUS BY PCR 2022 19:00:00 [facility]          

 

                VARICELLA ZOSTER BY PCR 2022 19:00:00 [facility]          

 

                BAL CELL COUNT AND 2022 19:00:00 [facility]



                DIFFERENTIAL                    [facility]          

 

                BAL T4T8        2022 19:00:00 Serjio, Oswaldo  [facility]

spital



                                                [place]          

 

                OR FL > 1 HOUR  2022 18:45:00 Bassett, Oswaldo  [facility]

spital



                                                [place]          

 

                CYTOLOGY        2022 17:47:00 Serjio, Oswaldo  [facility]

spital



                (NON-GYNECOLOGICAL)                 [place]          



                REQUEST                                         

 

                MT [facility]  [facility]22 [facility]



                ENDOTRACHEAL AIRWAY                                 

 

                BRONCHOSCOPY    2022 17:11:00 Oswaldo Bassett Ho

spital



                                                [place]          

 

                POC PANEL       2022 15:51:00 Serjio Oswaldo  [facility]

spital



                                                [place]          

 

                PROTHROMBIN TIME WITH INR, 2022 15:48:00 Oswaldo Bassett  [facility]



                I-STAT                          [place]          

 

                ECG 12-LEAD     2022 15:35:42 [facility]

 

                CT CHEST WO CONTRAST 2022 18:51:46 Oswaldo Bassett

[facility]



                                                [place]          

 

                VACCINATIONS - CONSENTS, 2021 06:01:00 Doctor Unassigned, 

[facility]



                ELIGIBILITY, HISTORY                 No Name         Medical Bra

nch

 

                MAGNESIUM       2019 14:57:00 Juanito Lerner [facility]

 

                VITAMIN B12, LEVEL 2019 14:57:00 Juanito Lerner [facility]

 

                THYROID STIMULATING 2019 14:57:00 Juanito Lerner [facility]



                HORMONE                                         [facility]

 

                COMP. METABOLIC PANEL 2019 14:57:00 [facility]



                ([id number])                                         [facility]

 

                CBC WITH DIFFERENTIAL 2019 14:57:00 Juanito Lerner Un

[facility]

 

                GLYCOSYLATED HEMOGLOBIN 2019 14:57:00 Juanito Lerner 

[facility]



                (A1C)                                           [facility]

 

                HCV ANTIBODY    2019 14:57:00 Juanito Lerner [facility]

 

                VITAMIN D, 25-OH 2019 14:57:00 Juanito Lerner [facility]

 

                ADC OR MACIEJ ONLY - RPR 2019 14:57:00 Juanito Lerner [facility]

 

                CONSENT/REFUSAL FOR 2019 13:36:14 Doctor Unassigned, [facility]



                DIAGNOSIS AND TREATMENT                 [facility]         Medical 

[place]

 

                ASSIGNMENT OF BENEFITS 2019 13:35:57 Doctor Unassigned, Christian[facility]

[facility]



                                                [facility]         [facility] [place]







Plan of Care







             Planned Activity Planned Date Details      Comments     Source

 

             Future Scheduled 2023   Pneumococcal Vaccine:              [facility]



             Test         07:42:51     Pediatrics (0 to 5              



                                       Years) and At-Risk              



                                       Patients (6 to 64              



                                       Years) (1 - PCV) [code              



                                       = Pneumococcal              



                                       Vaccine: Pediatrics (0              



                                       to 5 Years) and              



                                       At-Risk Patients (6 to              



                                       64 Years) (1 - PCV)]              

 

             Future Scheduled 2023   Hepatitis C screening              [facility]



             Test         07:42:51     (procedure) [code =              



                                       531187628]                

 

             Future Scheduled 2023   Screening for              [facility]



             Test         07:42:51     malignant neoplasm of              



                                       cervix (procedure)              



                                       [code = 231586198]              

 

             Future Scheduled 2023   BREAST CANCER              [facility]



             Test         07:42:51     SCREENING [code =              



                                       BREAST CANCER              



                                       SCREENING]                

 

             Future Scheduled 2023   COLONOSCOPY SCREENING              [facility]



             Test         07:42:51     [code = COLONOSCOPY              



                                       SCREENING]                

 

             Future Scheduled 2023   Screening for              [facility]



             Test         07:42:51     malignant neoplasm of              



                                       lung (procedure) [code              



                                       = 021074317]              

 

             Future Scheduled 2023   SHINGLES VACCINES (1              Met

[facility]



             Test         07:42:51     of 2) [code = SHINGLES              



                                       VACCINES (1 of 2)]              

 

             Future Scheduled 2023   COVID-19 VACCINE (3 -              Me

[facility]



             Test         07:42:51     Booster for Moderna              



                                       series) [code =              



                                       COVID-19 VACCINE (3 -              



                                       Booster for Moderna              



                                       series)]                  

 

             Future Scheduled 2023   INFLUENZA VACCINE              [facility]

t Hospital



             Test         07:42:51     [code = INFLUENZA              



                                       VACCINE]                  







Encounters







        Start   End     Encounter Admission Attending Care    Care    Encounter 

Source



        Date/Time Date/Time Type    Type    Clinicians Facility Department ID   

   

 

        2022 [facility]         Dionicio 1.2.840.1 [id number]

[id number] 

[facility]



        10:36:40 23:59:00 Encounter         Sergio A. 77910.1.1         [address]    

 st



                                                3.430.2.7                 Hospit

a



                                                .3.621538                 l



                                                .8                      

 

        2022 Outpatient         DIONICIO, [facility]     210

[id number] [place]



        00:00:00 00:00:00                 SERGIO Sullivan7     Method

i



                                                                        st

 

        2022 Travel                  1.2.840.1 1.2.887.958 [id number]

[id number] [facility]



        00:00:00 00:00:00                         71606.1.1 350.1.13.43 815     

st



                                                3.430.2.7 0.2.7.3.698         Ho

spita



                                                .3.272378 084.8           l



                                                .8                      

 

        2022-10-13 2022-10-13 Orders          Dionicio, 1.2.840.1 [id number] 

[id number] [facility]



        00:00:00 00:00:00 Only            Sergio SCHROEDER. 50854.1.1         618     s

t



                                                3.430.2.7                 Hospit

a



                                                .3.084308                 l



                                                .8                      

 

        2022 Orders          Bassett, 1.2.840.1 [id number] 

[id number] [facility]



        00:00:00 00:00:00 Only            Oswaldo Honorio 29806.1.1         021     

st



                                                3.430.2.7                 Hospit

a



                                                .3.953533                 l



                                                .8                      

 

        2022 [facility]         Bassett, 1.2.840.1 [id number]

[id number] [facility]



        10:08:00 14:52:00 Encounter         Oswaldo Honorio 54259.1.1         [address]   

  st



                                                3.430.2.7                 Hospit

a



                                                .3.406668                 l



                                                .8                      

 

        2022 Surgery         [facility], 1.2.840.1 [id number] 

[id number] [facility]



        12:00:00 14:00:00                 Oswaldo Honorio 48864.1.1         202     

st



                                                3.430.2.7                 Hospit

a



                                                .3.944595                 l



                                                .8                      

 

        2022 Anesthesia         Melendez, 1.2.840.1 [id number]

[id number] 

Method[facility]



        12:12:00 13:58:00 Event           Liz Gaona 27557.1.1         676     s

t



                                                3.430.2.7                 Hospit

a



                                                .3.298324                 l



                                                .8                      

 

        2022 Travel                  1.2.840.1 1.2.961.854 [id number]

[id number] [facility]



        00:00:00 00:00:00                         84252.1.1 350.1.13.43 147     

st



                                                3.430.2.7 0.2.7.3.698         Ho

spita



                                                .3.383498 084.8           l



                                                .8                      

 

        2022 [facility]     021     [id number] [place]



        00:00:00 00:00:00                 OSWALDO                    119     Method

i



                                                                        st

 

        2022 Documentat         [facility], 1.2.840.1 [id number] 2

[id number] 

[facility]



        00:00:00 00:00:00 ion             Oswaldo Olmedo 20070.1.1         816     

st



                                                3.430.2.7                 Hospit

a



                                                .3.467691                 l



                                                .8                      

 

        2022 [facility], 1.2.840.1 [id number] 210

[id number] [facility]



        13:08:52 23:59:00 Encounter         Oswaldomarie Olmedo 43133.1.1         337   

  st



                                                3.430.2.7                 Hospit

a



                                                .3.608983                 l



                                                .8                      

 

        2022 Travel                  1.2.840.1 1.2.003.004 9165

[id number] [facility]



        00:00:00 00:00:00                         27565.1.1 350.1.13.43 194     

st



                                                3.430.2.7 0.2.7.3.698         Ho

spita



                                                .3.814623 084.8           l



                                                .8                      

 

        2022 Outpatient         [facility]     [id number] [place]



        00:00:00 00:00:00                 OSWALDO                    337     Method

i



                                                                        st

 

        2022 Travel                  1.2.840.1 1.2.648.036 [id number]

[id number] [facility]



        00:00:00 00:00:00                         49026.1.1 350.1.13.43 246     

st



                                                3.430.2.7 0.2.7.3.698         Ho

spita



                                                .3.097502 084.8           l



                                                .8                      

 

        2022 Transcribe         Serjio, 1.2.840.1 [id number]

[id number] 

Methodi



        00:00:00 00:00:00 Orders          Oswaldo Honorio 44444.1.1         398     

st



                                                3.430.2.7                 Hospit

a



                                                .3.773685                 l



                                                .8                      

 

        2021 Orders          Doctor  HUSEYIN    1.2.840.114 [id number]

40 Univers



        00:00:00 00:00:00 Only            Unassigned, IVETTE   350.1.13.10       

  ity of



                                        [facility] 4.2.7.2.686         Miguel

as



                                                        [phone]         Medi

nitza



                                                        009             Branch

 

        2020 [facility] R       [facility]    [id number]

[id number] Univers



        13:00:00 13:00:00                 YENI                           itenrique of



                                                                        [facility]

 

        2020-12-10 2020-12-10 Letter          Neurology [facility] 1.2.840.114 [id number]

[id number] Univers



        00:00:00 00:00:00 (Out)                   Y [facility] 350.1.13.10         i

ty of



                                                CLIN[facility] 4.2.7.2.686         [place]

s



                                                        [phone]         Medi

nitza



                                                        092             Branch

 

        2020-11-10 2020-11-10 Outpatient R       [facility]    [id number]

[id number] Univers



        16:30:00 16:30:00                 WONDIFUL                         ity o

f



                                                                        [facility]

 

        2020-11-10 2020-11-10 Telemedici         [facility]    1.2.840.114 [id number]

[id number] Univers



        07:12:14 16:20:03 ne Visit         [facility]  350.1.13.10       

  ity of



                                                [place] 4.2.7.2.686         Miguel

as



                                                Professio [phone]         [address]



                                                Office                  



                                                [facility]                 



                                                One                     

 

        2020 Laboratory         Lab, Adc Fam Pob I [facility]    1.2.

840.114 [id number] 

Univers



        10:31:21 10:51:21 Only            [facility]  350.1.13.10    

     ity of



                                                [place] 4.2.7.2.686         Miguel

as



                                                Professio [phone]         [address]



                                                Office                  



                                                [facility]                 



                                                One                     

 

        2020 Outpatient R       ESAU  [facility]    [id number]

[id number] Univers



        10:40:00 10:40:00                 SANDY                         ity of



                                                                        [facility]

 

        2020 Letter          Doctor  HUSEYIN    1.2.840.114 [id number]

32 Univers



        00:00:00 00:00:00 (Out)           Unassigned, IVETTE   350.1.13.10       

  ity of



                                        [facility] 4.2.7.2.686         Miguel

as



                                                        [phone]         [address]

 

        2020 Refill          [facility]    1.2.840.114 [id number]

[id number] Univers



        00:00:00 00:00:00                 [facility]  350.1.13.10        

 ity of



                                                [place] 4.2.7.2.686         Miguel

as



                                                Professio [phone]         [address]



                                                Office                  



                                                [facility]                 



                                                One                     

 

        2019 Telephone         [facility]    1.2.840.114 [id number]

[id number] Univers



        00:00:00 00:00:00                 [facility]  350.1.13.10        

 ity of



                                                [place] 4.2.7.2.686         Miguel

as



                                                Professio [phone]         [address]



                                                Office                  



                                                [facility]                 



                                                One                     

 

        2019 Telephone         [facility]    1.2.840.114 [id number]

[id number] Univers



        00:00:00 00:00:00                 [facility]  350.1.13.10        

 ity of



                                                [place] 4.2.7.2.686         Miguel

as



                                                Professio [phone]         [address]



                                                Office                  



                                                [facility]                 



                                                One                     

 

        2019 Office          [facility]    1.2.840.114 [id number]

[id number] Univers



        08:40:26 10:09:31 Visit           [employer]  350.1.13.10        

 ity of



                                                [place] 4.2.7.2.686         Miguel

as



                                                [place] [phone]         Me

dical



                                                nal     044             Branch



                                                Office                  



                                                Building                 



                                                One                     

 

        2019 Orders          Doctor  HUSEYIN    1.2.840.114 [id number]

74 Univers



        00:00:00 00:00:00 Only            Unassigned, IVETTE   350.1.13.10       

  ity of



                                        [facility] 4.2.7.2.686         Miguel

as



                                                        [phone]         Medi

nitza



                                                        009             Branch

 

        2019 Outpatient                 [facility] [address]

[id number] Common



        10:30:00 10:30:00                         t [facility] [address]         [facility]

it



                                                Road    [facility]







Results







           Test Description Test Time  Test Comments Results    Result Comments 

Source









                    POC glucose         2022 16:57:00 









                      Test Item  Value      Reference Range Interpretation Comme

[facility]









             POC glucose (test code = 32454-6) 97 mg/dL     65-99               

       Name: Roselia 

AidenDevice ID:



                                                                 [id number]



[facility] [id number] 01:13:00





             Test Item    Value        Reference Range Interpretation Comments

 

             AFB culture  No growth                              Specimen



             isolate (test after 6 weeks                           InformationSp

ecimen



             code = 543-9) of                                     Source: Bronch

ial alveolar



                          incubation.                            lavageSpecimen 

Site: RUL



                                                                 (Right Upper Lo

be)



[facility] [id number] 12:01:00





             Test Item    Value        Reference Range Interpretation Comments

 

             Fungus culture No growth                              Specimen



             isolate (test after 4 weeks                           InformationSp

ecimen



             code = 580-1)                                        Source: Bronch

ial alveolar



                                                                 lavageSpecimen 

Site: RUL



                                                                 (Right Upper Lo

be)



[facility] Hospital[facility]ion[facility] [id number] 16:31:00





             Test Item    Value        Reference    Interpretation Comments



                                       Range                     

 

             Legionella   No Legionella                           Specimen



             culture isolate isolated.                              InformationS

pecimen



             (test code =                                        Source: Bronchi

al



             1656)                                               alveolar lavage

Specimen



                                                                 Site: RUL (Righ

t Upper



                                                                 Lobe)



[facility] CenterNocardia [id number] 14:52:00





             Test Item    Value        Reference Range Interpretation Comments

 

             Nocardia     No Nocardia                            Specimen



             culture isolate isolated after                           Informatio

nSpecimen



             (test code = 7 days.                                Source: Bronchi

al



             1808)                                               alveolar lavage

Specimen



                                                                 Site: RUL (Righ

t Upper



                                                                 Lobe)



[facility] [id number] 21:45:02





             Test Item    Value        Reference Range Interpretation Comments

 

             Case number (test code = [id number]                           



             [id number])                                            

 

             Surgical pathology See link below for                           



             report (test code = PDF Lab Report                           



             6235)                                               

 

             Result status (test code This is Final Report                      

     



             = 5326430)   for [id number]                           



[facility] (non[facility]gynecological[facility] [id number] 20:58:33





             Test Item    Value        Reference Range Interpretation Comments

 

             Case number (test code = [id number]                           



             [id number])                                            

 

             Cytology     See link below for                           



             (non-gynecological) PDF Lab Report                           



             report (test code =                                        



             1178)                                               

 

             Result status (test code This is Final Report                      

     



             = 8012397)   for [id number]                           



[facility] HospitalAFB [id number] 19:19:00





             Test Item    Value        Reference Range Interpretation Comments

 

             AFB stain    No acid fast                           Specimen



             (test code = bacilli (AFB)                           InformationSpe

cimen



             676-7)       seen.                                  Source: Bronchi

al alveolar



                                                                 lavageSpecimen 

Site: RUL



                                                                 (Right Upper Lo

be)



[facility] [id number] 18:03:00





             Test Item    Value        Reference Range Interpretation Comments

 

             Fungus smear No fungi                               Specimen



             (test code = observed.                              InformationSpec

imen Source:



             1443)                                               Bronchial alveo

lar



                                                                 lavageSpecimen 

Site: RUL



                                                                 (Right Upper Lo

be)



[facility] Hospital[facility] pathogen panel with COVID-19 [id number] 
07:35:00





             Test Item    Value        Reference    Interpretation Comments



                                       Range                     

 

             Adenovirus PCR (test Not Detected                           Specime

n



             code = 7092)                                        InformationSpec

imen



                                                                 Source: Bronchi

al



                                                                 alveolar lavage

Specimen



                                                                 Site: RUL (Righ

t Upper



                                                                 Lobe)

 

             Coronavirus HKU1 PCR Not Detected                           



             (test code = 7093)                                        

 

             Coronavirus NL63 PCR Not Detected                           



             (test code = 7094)                                        

 

             Coronavirus 229E PCR Not Detected                           



             (test code = 7095)                                        

 

             Coronavirus OC43 PCR Not Detected                           



             (test code = 7096)                                        

 

             Human        Not Detected                           



             metapneumovirus PCR                                        



             (test code = 7097)                                        

 

             Human        Detected                  A            



             rhinovirus/enterovir                                        



             us PCR (test code =                                        



             7098)                                               

 

             Influenza A PCR Not Detected                           



             (test code =                                        



             23782-6)                                            

 

             Influenza A/H1 PCR Not Reported                           



             (test code = 7100)                                        

 

             Influenza A/H3 PCR Not Reported                           



             (test code = 7102)                                        

 

             Influenza A/H1-2009 Not Reported                           



             PCR (test code =                                        



             7101)                                               

 

             Respiratory  Not Detected                           



             syncytial virus PCR                                        



             (test code =                                        



             52155-1)                                            

 

             Parainfluenza virus Not Detected                           



             1 PCR (test code =                                        



             7105)                                               

 

             Parainfluenza virus Not Detected                           



             2 PCR (test code =                                        



             7106)                                               

 

             Parainfluenza virus Not Detected                           



             3 PCR (test code =                                        



             7107)                                               

 

             Parainfluenza virus Not Detected                           



             4 PCR (test code =                                        



             7108)                                               

 

             Bordetella pertussis Not Detected                           



             PCR (test code =                                        



             0256074)                                            

 

             Bordetella   Not Detected                           



             parapertussis PCR                                        



             (test code =                                        



             0328452)                                            

 

             Chlamydia pneumoniae Not Detected                           



             PCR (test code =                                        



             3753)                                               

 

             Mycoplasma   Not Detected                           



             pneumoniae PCR (test                                        



             code = 7110)                                        

 

             COVID-19 qualitative Not Detected                           



             RT-PCR result (test                                        



             code = 37468-6)                                        

 

             Lab Interpretation Abnormal                               



             (test code =                                        



             92763-6)                                            



[facility] HospitalG 12 [id number] 03:33:16





             Test Item    Value        Reference Range Interpretation Comments

 

             Ventricular rate (test 63                                     



             code = 253)                                         

 

             Atrial rate (test code = 63                                     



             255)                                                

 

             MT interval (test code = 152                                    



             266)                                                

 

             QRSD interval (test code 76                                     



             = 260)                                              

 

             QT interval (test code = 430                                    



             264)                                                

 

             QTC interval (test code 440                                    



             = 265)                                              

 

             P axis 1 (test code = 81                                     



             267)                                                

 

             QRS axis 1 (test code = 5                                      



             268)                                                

 

             T wave axis (test code = 61                                     



             270)                                                

 

             EKG impression (test Normal sinus                           



             code = 273)  rhythm-Normal ECG-No                           



                          previous ECGs                           



                          available-Electronica                           



                          lly Signed By Ibis CRAWLEY, [facility] ([id number]) on                           



                          2022 10:33:14 PM                           



[facility] HospitalG[facility] [id number] 02:49:00





             Test Item    Value        Reference Range Interpretation Comments

 

             Gram stain   No organisms                           Specimen



             isolate (test seen                                   InformationSpe

cimen



             code = 1469)                                        Source: Bronchi

al



                                                                 alveolar lavage

Specimen



                                                                 Site: RUL (Righ

t Upper



                                                                 Lobe)



[facility] HospitalBAL cell count and [id number] 01:40:00





             Test Item    Value        Reference Range Interpretation Comments

 

             BAL specimen RUL BAL                                



             source (test code                                        



             = 86871-0)                                          

 

             BAL cell count 0.050 m/mL                             83% VIABILITY

Normal



             (test code =                                        ranges: Nonsmok

ers: 0.007



             01048-2)                                            - 0.363 Smokers

: 0 - 1.31

 

             BAL PAMS (test 47 %                                   Normal ranges

:



             code = 60996-2)                                        Nonsmokers: 

65 - 100



                                                                 Smokers: 81 - 1

00

 

             BAL PMNS (test 53 %                                   Normal ranges

:



             code = 9306-2)                                        Nonsmokers: 0

 - 3



                                                                 Smokers: 0 - 2

 

             ANA (test code = BAL RUL                                



             ANA)                                                



[facility] HospitalVITAMIN D, [id number] 14:09:00





             Test Item    Value        Reference Range Interpretation Comments

 

             VIT D 25OH (test 26 ng/mL     25-80                     



             code =                                              



             2280877556)                                         

 

             25-Hydroxy D3 26.0 ng/mL                             



             (test code =                                        



             1287854299)                                         

 

             25-Hydroxy D2 <2.5         ng/mL                     



             (test code =                                        



             6003632509)                                         

 

             ANA (test code = Test developed and                           



             ANA)         characteristics determined                           



                          by [facility].                          

 



[facility] CampusVITAMIN D, [id number] 14:09:00





             Test Item    Value        Reference Range Interpretation Comments

 

             VIT D 25OH (test 26 ng/mL     25-80                     



             code =                                              



             9901712960)                                         

 

             25-Hydroxy D3 26.0 ng/mL                             



             (test code =                                        



             9645580478)                                         

 

             25-Hydroxy D2 <2.5         ng/mL                     



             (test code =                                        



             1052557413)                                         

 

             ANA (test code = Test developed and                           



             ANA)         characteristics determined                           



                          by [facility].                          

 



[facility] OR MACIEJ ONLY - [id number] 05:15:00





             Test Item    Value        Reference Range Interpretation Comments

 

             Lab Interpretation (test code = Normal                             

    



             12904-9)                                            



[facility] OR MACIEJ ONLY - [id number] 05:15:00





             Test Item    Value        Reference Range Interpretation Comments

 

             Lab Interpretation (test code = Normal                             

    



             15623-8)                                            



[id number]13:00





             Test Item    Value        Reference Range Interpretation Comments

 

             HCV Semi-Quantitative (test code =                                 

       



             39693-7)                                            



[facility] [id number] 21:13:00





             Test Item    Value        Reference Range Interpretation Comments

 

             HCV Semi-Quantitative (test code =                                 

       



             61612-5)                                            



[facility] [id number] 20:57:00





             Test Item    Value        Reference Range Interpretation Comments

 

             VIT B12 (test code = 402 pg/mL    240-930                   



             [id number])                                         

 

             ANA (test code = ANA) Biotin has been                           



                          reported to cause a                           



                          positive bias,                           



                          interpret results                           



                          relative to                            



                          patient's use of                           



                          biotin.                                

 

             Lab Interpretation (test Normal                                 



             code = 40628-9)                                        



[facility] [id number] 20:57:00





             Test Item    Value        Reference Range Interpretation Comments

 

             VIT B12 (test code = 402 pg/mL    240-930                   



             [id number])                                         

 

             ANA (test code = ANA) Biotin has been                           



                          reported to cause a                           



                          positive bias,                           



                          interpret results                           



                          relative to                            



                          patient's use of                           



                          biotin.                                

 

             Lab Interpretation (test Normal                                 



             code = 65666-5)                                        



[facility] 
18:16:00





             Test Item    Value        Reference    Interpretation Comments



                                       Range                     

 

             HGB A1C (test code =              See_Comment                [Autom

ated



             4548-4)                                             message] The



                                                                 system which



                                                                 generated this



                                                                 result



                                                                 transmitted



                                                                 reference range

:



                                                                 4.0 - 6.0 %



                                                                 NGSP. The



                                                                 reference range



                                                                 was not used to



                                                                 interpret this



                                                                 result as



                                                                 normal/abnormal

.

 

             ANA (test code = %A1C (NGSP)                            



             ANA)         Interpretation                           



                          (ADA)4.8-5.6? Normal                           



                          or (Non-Diabetic                           



                          Range)5.7-6.4?                           



                          Increased Risk                           



                          (Pre-Diabetic)>6.5?D                           



                          iabetes Indicated                           

 

             Lab Interpretation Normal                                 



             (test code =                                        



             41160-0)                                            



[facility] 
18:16:00





             Test Item    Value        Reference    Interpretation Comments



                                       Range                     

 

             HGB A1C (test code =              See_Comment                [Autom

ated



             4548-4)                                             message] The



                                                                 system which



                                                                 generated this



                                                                 result



                                                                 transmitted



                                                                 reference range

:



                                                                 4.0 - 6.0 %



                                                                 NGSP. The



                                                                 reference range



                                                                 was not used to



                                                                 interpret this



                                                                 result as



                                                                 normal/abnormal

.

 

             ANA (test code = %A1C (NGSP)                            



             ANA)         Interpretation                           



                          (ADA)4.8-5.6? Normal                           



                          or (Non-Diabetic                           



                          Range)5.7-6.4?                           



                          Increased Risk                           



                          (Pre-Diabetic)>6.5?D                           



                          iabetes Indicated                           

 

             Lab Interpretation Normal                                 



             (test code =                                        



             50910-6)                                            



[facility] [id number] 17:28:00





             Test Item    Value        Reference Range Interpretation Comments

 

             TSH (test code =              See_Comment                [Automated

 message]



             0405810937)                                         The system [employer]



                                                                 generated this 

result



                                                                 transmitted ref

erence



                                                                 range: 0.45 - 4

.70



                                                                 mIU/L. The refe

rence



                                                                 range was not u

sed to



                                                                 interpret this 

result



                                                                 as normal/abnor

mal.

 

             Lab Interpretation (test Normal                                 



             code = 34913-9)                                        



[facility] [id number] 17:28:00





             Test Item    Value        Reference Range Interpretation Comments

 

             TSH (test code =              See_Comment                [Automated

 message]



             9565325691)                                         The system [employer]



                                                                 generated this 

result



                                                                 transmitted ref

erence



                                                                 range: 0.45 - 4

.70



                                                                 mIU/L. The refe

rence



                                                                 range was not u

sed to



                                                                 interpret this 

result



                                                                 as normal/abnor

mal.

 

             Lab Interpretation (test Normal                                 



             code = 48650-7)                                        



[facility] 16[facility]33[facility]00





             Test Item    Value        Reference Range Interpretation Comments

 

             MAGNESIUM (test code = 5758843388) 2.1 mg/dL    1.7-2.4            

       

 

             Lab Interpretation (test code = Normal                             

    



             18224-4)                                            



[facility] 16[facility]33:00





             Test Item    Value        Reference Range Interpretation Comments

 

             MAGNESIUM (test code = 1448289980) 2.1 mg/dL    1.7-2.4            

       

 

             Lab Interpretation (test code = Normal                             

    



             63391-0)                                            



[facility] ([id number])2019 
16:32:00





             Test Item    Value        Reference Range Interpretation Comments

 

             NA (test code = 143 mmol/L   135-145                   



             6916908985)                                         

 

             K (test code = 4.9 mmol/L   3.5-5                     



             1778368533)                                         

 

             CL (test code = 107 mmol/L                       



             5249058087)                                         

 

             CO2 TOTAL (test code = 27 mmol/L    23-31                     



             2042693228)                                         

 

             AGAP (test code =              2-16                      



             6364570116)                                         

 

             BUN (test code = 15 mg/dL     7-23                      



             2798019058)                                         

 

             GLUCOSE (test code = 74 mg/dL                         



             6708823107)                                         

 

             CREATININE (test code = 1.15 mg/dL   0.5-1.04     H            



             3164010370)                                         

 

             TOTAL BILI (test code = 0.3 mg/dL    0.1-1.1                   



             1878132180)                                         

 

             CALCIUM (test code = 9.7 mg/dL    8.6-10.6                  



             6040684258)                                         

 

             T PROTEIN (test code = 7.3 g/dL     6.3-8.2                   



             4449548104)                                         

 

             ALBUMIN (test code = 4.6 g/dL     3.5-5                     



             0070025778)                                         

 

             ALK PHOS (test code = 71 U/L                           



             2914979833)                                         

 

             ALT(SGPT) (test code = 26 U/L       9-51                      



             2061780522)                                         

 

             AST(SGOT) (test code = 26 U/L       13-40                     



             6819266933)                                         

 

             eGFR Calculation              mL/min/1.73m2              



             (Non-)                                        



             (test code =                                        



             2573588504)                                         

 

             eGFR Calculation              mL/min/1.73m2              



             (African American)                                        



             (test code =                                        



             1785876523)                                         

 

             ANA (test code = ANA) Association of                           



                          Glomerular Filtration                           



                          Rate (GFR) and Staging                           



                          of Kidney                              



                          Disease*+-------------                           



                          ----------+-----------                           



                          ----------+-----------                           



                          --------------+| GFR                           



                          (mL/min/1.73 m2)?|                           



                          With Kidney                            



                          Damage?|?Without                           



                          Kidney                                 



                          Damage+---------------                           



                          --------+-------------                           



                          --------+-------------                           



                          ------------+|?>90?|?S                           



                          tage one?|?                            



                          Normal?+--------------                           



                          ---------+------------                           



                          ---------+------------                           



                          -------------+|?60-89?                           



                          |?Stage two?|?                           



                          Decreased GFR?                           



                          +---------------------                           



                          --+-------------------                           



                          --+-------------------                           



                          ------+|?30-59?|?Stage                           



                          three?|? Stage three?                           



                          +---------------------                           



                          --+-------------------                           



                          --+-------------------                           



                          ------+|?15-29?|?Stage                           



                          four? |? Stage                           



                          four?+----------------                           



                          -------+--------------                           



                          -------+--------------                           



                          -----------+|?<15 (or                           



                          dialysis)?|?Stage                           



                          five? |? Stage                           



                          five?+----------------                           



                          -------+--------------                           



                          -------+--------------                           



                          -----------+*Each                           



                          stage assumes the                           



                          associated GFR level                           



                          has been in effect for                           



                          at least three                           



                          months.?Stages 1 to 5,                           



                          with or without kidney                           



                          disease, indicate                           



                          chronic kidney                           



                          disease.Notes:                           



                          Determination of                           



                          stages one and two                           



                          (with eGFR                             



                          >59mL/min/1.73 m2)                           



                          requires estimation of                           



                          kidney damage for at                           



                          least three months as                           



                          defined by structural                           



                          or functional                           



                          abnormalities of the                           



                          kidney, manifested by                           



                          either:Pathological                           



                          abnormalities or                           



                          Markers of kidney                           



                          damage (including                           



                          abnormalities in the                           



                          composition of the                           



                          blood or urine or                           



                          abnormalities in                           



                          imaging tests).                           

 

             Lab Interpretation Abnormal                               



             (test code = 14689-2)                                        



[facility]. METABOLIC PANEL ([id number])2019 
16:32:00





             Test Item    Value        Reference Range Interpretation Comments

 

             NA (test code = 143 mmol/L   135-145                   



             6798909906)                                         

 

             K (test code = 4.9 mmol/L   3.5-5                     



             3535537724)                                         

 

             CL (test code = 107 mmol/L                       



             0665564785)                                         

 

             CO2 TOTAL (test code = 27 mmol/L    23-31                     



             2806816851)                                         

 

             AGAP (test code =              2-16                      



             3237898712)                                         

 

             BUN (test code = 15 mg/dL     7-23                      



             8387267349)                                         

 

             GLUCOSE (test code = 74 mg/dL                         



             8776059836)                                         

 

             CREATININE (test code = 1.15 mg/dL   0.5-1.04     H            



             6246757049)                                         

 

             TOTAL BILI (test code = 0.3 mg/dL    0.1-1.1                   



             3608339785)                                         

 

             CALCIUM (test code = 9.7 mg/dL    8.6-10.6                  



             9992304819)                                         

 

             T PROTEIN (test code = 7.3 g/dL     6.3-8.2                   



             6185761018)                                         

 

             ALBUMIN (test code = 4.6 g/dL     3.5-5                     



             6968991723)                                         

 

             ALK PHOS (test code = 71 U/L                           



             9328056283)                                         

 

             ALT(SGPT) (test code = 26 U/L       9-51                      



             0374952944)                                         

 

             AST(SGOT) (test code = 26 U/L       13-40                     



             4982108915)                                         

 

             eGFR Calculation              mL/min/1.73m2              



             (Non-)                                        



             (test code =                                        



             7333508400)                                         

 

             eGFR Calculation              mL/min/1.73m2              



             (African American)                                        



             (test code =                                        



             4824785468)                                         

 

             ANA (test code = ANA) Association of                           



                          Glomerular Filtration                           



                          Rate (GFR) and Staging                           



                          of Kidney                              



                          Disease*+-------------                           



                          ----------+-----------                           



                          ----------+-----------                           



                          --------------+| GFR                           



                          (mL/min/1.73 m2)?|                           



                          With Kidney                            



                          Damage?|?Without                           



                          Kidney                                 



                          Damage+---------------                           



                          --------+-------------                           



                          --------+-------------                           



                          ------------+|?>90?|?S                           



                          tage one?|?                            



                          Normal?+--------------                           



                          ---------+------------                           



                          ---------+------------                           



                          -------------+|?60-89?                           



                          |?Stage two?|?                           



                          Decreased GFR?                           



                          +---------------------                           



                          --+-------------------                           



                          --+-------------------                           



                          ------+|?30-59?|?Stage                           



                          three?|? Stage three?                           



                          +---------------------                           



                          --+-------------------                           



                          --+-------------------                           



                          ------+|?15-29??|?Stag                           



                          e four? |? Stage                           



                          four?+----------------                           



                          -------+--------------                           



                          -------+--------------                           



                          -----------+|?<15 (or                           



                          dialysis)?|?Stage                           



                          five? |? Stage                           



                          five?+----------------                           



                          -------+--------------                           



                          -------+--------------                           



                          -----------+*Each                           



                          stage assumes the                           



                          associated GFR level                           



                          has been in effect for                           



                          at least three                           



                          months.?Stages 1 to 5,                           



                          with or without kidney                           



                          disease, indicate                           



                          chronic kidney                           



                          disease.Notes:                           



                          Determination of                           



                          stages one and two                           



                          (with eGFR                             



                          >59mL/min/1.73 m2)                           



                          requires estimation of                           



                          kidney damage for at                           



                          least three months as                           



                          defined by structural                           



                          or functional                           



                          abnormalities of the                           



                          kidney, manifested by                           



                          either:Pathological                           



                          abnormalities or                           



                          Markers of kidney                           



                          damage (including                           



                          abnormalities in the                           



                          composition of the                           



                          blood or urine or                           



                          abnormalities in                           



                          imaging tests).                           

 

             Lab Interpretation Abnormal                               



             (test code = 24582-0)                                        



[facility] WITH [id number] 15:47:00





             Test Item    Value        Reference Range Interpretation Comments

 

             WBC (test code =              See_Comment                [Automated



             6690-2)                                             message] The sy

stem



                                                                 which generated



                                                                 this result



                                                                 transmitted



                                                                 reference range

:



                                                                 4.30 - 11.10



                                                                 10*3/?L. The



                                                                 reference range

 was



                                                                 not used to



                                                                 interpret this



                                                                 result as



                                                                 normal/abnormal

.

 

             RBC (test code =              See_Comment                [Automated



             789-8)                                              message] The sy

stem



                                                                 which generated



                                                                 this result



                                                                 transmitted



                                                                 reference range

:



                                                                 3.93 - 5.25



                                                                 10*6/?L. The



                                                                 reference range

 was



                                                                 not used to



                                                                 interpret this



                                                                 result as



                                                                 normal/abnormal

.

 

             HGB (test code = 14.2 g/dL    11.6-15                   



             718-7)                                              

 

             HCT (test code = 41.9 %       35.7-45.2                 



             4544-3)                                             

 

             MCV (test code = 93.3 fL      80.6-95.5                 



             787-2)                                              

 

             MCH (test code = 31.6 pg      25.9-32.8                 



             785-6)                                              

 

             MCHC (test code = 33.9 g/dL    31.6-35.1                 



             786-4)                                              

 

             RDW-SD (test code = 42.3 fL      39-49.9                   



             76553-8)                                            

 

             RDW-CV (test code = 12.2 %       12-15.5                   



             788-0)                                              

 

             PLT (test code =              See_Comment                [Automated



             777-3)                                              message] The sy

stem



                                                                 which generated



                                                                 this result



                                                                 transmitted



                                                                 reference range

:



                                                                 166 - 358 10*3/

?L.



                                                                 The reference r

dejon



                                                                 was not used to



                                                                 interpret this



                                                                 result as



                                                                 normal/abnormal

.

 

             MPV (test code = 8.5 fL       9.5-12.9     L            



             53036-3)                                            

 

             NRBC/100 WBC (test              See_Comment                [Automat

ed



             code = 2304001412)                                        message] 

The system



                                                                 which generated



                                                                 this result



                                                                 transmitted



                                                                 reference range

:



                                                                 0.0 - 10.0 /100



                                                                 WBCs. The refer

ence



                                                                 range was not u

sed



                                                                 to interpret th

is



                                                                 result as



                                                                 normal/abnormal

.

 

             NRBC x10^3 (test code <0.01        See_Comment                [Auto

mated



             = [id number])                                        message] The s

ystem



                                                                 which generated



                                                                 this result



                                                                 transmitted



                                                                 reference range

:



                                                                 10*3/?L. The



                                                                 reference range

 was



                                                                 not used to



                                                                 interpret this



                                                                 result as



                                                                 normal/abnormal

.

 

             GRAN MAT (NEUT) % 65.8 %                                 



             (test code = 770-8)                                        

 

             IMM GRAN % (test code 0.20 %                                 



             = [id number])                                        

 

             LYMPH % (test code = 23.3 %                                 



             736-9)                                              

 

             MONO % (test code = 8.9 %                                  



             5905-5)                                             

 

             EOS % (test code = 1.4 %                                  



             713-8)                                              

 

             BASO % (test code = 0.4 %                                  



             706-2)                                              

 

             GRAN MAT x10^3(ANC) 3.19 10*3/uL 1.88-7.09                 



             (test code =                                        



             1600125630)                                         

 

             IMM GRAN x10^3 (test <0.03        0-0.06                    



             code = 4158084836)                                        

 

             LYMPH x10^3 (test code 1.13 10*3/uL 1.32-3.29    L            



             = 731-0)                                            

 

             MONO x10^3 (test code 0.43 10*3/uL 0.33-0.92                 



             = 742-7)                                            

 

             EOS x10^3 (test code = 0.07 10*3/uL 0.03-0.39                 



             711-2)                                              

 

             BASO x10^3 (test code <0.03        0.01-0.07                 



             = 704-7)                                            

 

             Lab Interpretation Abnormal                               



             (test code = 06882-7)                                        



[facility] WITH [id number] 15:47:00





             Test Item    Value        Reference Range Interpretation Comments

 

             WBC (test code =              See_Comment                [Automated



             1090-2)                                             message] The sy

stem



                                                                 which generated



                                                                 this result



                                                                 transmitted



                                                                 reference range

:



                                                                 4.30 - 11.10



                                                                 10*3/?L. The



                                                                 reference range

 was



                                                                 not used to



                                                                 interpret this



                                                                 result as



                                                                 normal/abnormal

.

 

             RBC (test code =              See_Comment                [Automated



             986-8)                                              message] The sy

stem



                                                                 which generated



                                                                 this result



                                                                 transmitted



                                                                 reference range

:



                                                                 3.93 - 5.25



                                                                 10*6/?L. The



                                                                 reference range

 was



                                                                 not used to



                                                                 interpret this



                                                                 result as



                                                                 normal/abnormal

.

 

             HGB (test code = 14.2 g/dL    11.6-15                   



             718-7)                                              

 

             HCT (test code = 41.9 %       35.7-45.2                 



             4544-3)                                             

 

             MCV (test code = 93.3 fL      80.6-95.5                 



             787-2)                                              

 

             MCH (test code = 31.6 pg      25.9-32.8                 



             785-6)                                              

 

             MCHC (test code = 33.9 g/dL    31.6-35.1                 



             786-4)                                              

 

             RDW-SD (test code = 42.3 fL      39-49.9                   



             49350-0)                                            

 

             RDW-CV (test code = 12.2 %       12-15.5                   



             788-0)                                              

 

             PLT (test code =              See_Comment                [Automated



             777-3)                                              message] The sy

stem



                                                                 which generated



                                                                 this result



                                                                 transmitted



                                                                 reference range

:



                                                                 166 - 358 10*3/

?L.



                                                                 The reference r

dejon



                                                                 was not used to



                                                                 interpret this



                                                                 result as



                                                                 normal/abnormal

.

 

             MPV (test code = 8.5 fL       9.5-12.9     L            



             23063-8)                                            

 

             NRBC/100 WBC (test              See_Comment                [Automat

ed



             code = [id number])                                        message] 

The system



                                                                 which generated



                                                                 this result



                                                                 transmitted



                                                                 reference range

:



                                                                 0.0 - 10.0 /100



                                                                 WBCs. The refer

ence



                                                                 range was not u

sed



                                                                 to interpret th

is



                                                                 result as



                                                                 normal/abnormal

.

 

             NRBC x10^3 (test code <0.01        See_Comment                [Auto

mated



             = [id number])                                        message] The s

ystem



                                                                 which generated



                                                                 this result



                                                                 transmitted



                                                                 reference range

:



                                                                 10*3/?L. The



                                                                 reference range

 was



                                                                 not used to



                                                                 interpret this



                                                                 result as



                                                                 normal/abnormal

.

 

             GRAN MAT (NEUT) % 65.8 %                                 



             (test code = 770-8)                                        

 

             IMM GRAN % (test code 0.20 %                                 



             = [id number])                                        

 

             LYMPH % (test code = 23.3 %                                 



             736-9)                                              

 

             MONO % (test code = 8.9 %                                  



             5905-5)                                             

 

             EOS % (test code = 1.4 %                                  



             713-8)                                              

 

             BASO % (test code = 0.4 %                                  



             706-2)                                              

 

             GRAN MAT x10^3(ANC) 3.19 10*3/uL 1.88-7.09                 



             (test code =                                        



             6888875080)                                         

 

             IMM GRAN x10^3 (test <0.03        0-0.06                    



             code = 5457083581)                                        

 

             LYMPH x10^3 (test code 1.13 10*3/uL 1.32-3.29    L            



             = 731-0)                                            

 

             MONO x10^3 (test code 0.43 10*3/uL 0.33-0.92                 



             = 742-7)                                            

 

             EOS x10^3 (test code = 0.07 10*3/uL 0.03-0.39                 



             711-2)                                              

 

             BASO x10^3 (test code <0.03        0.01-0.07                 



             = 704-7)                                            

 

             Lab Interpretation Abnormal                               



             (test code = 75271-0)                                        



[facility]

## 2023-05-24 NOTE — RAD REPORT
EXAM DESCRIPTION:  CT - Abdomen   Pelvis W Contrast - 5/24/2023 5:48 pm

 

CLINICAL HISTORY:  Abdominal pain

 

COMPARISON:  none.

 

TECHNIQUE:  Computed axial tomography of the abdomen pelvis was obtained. 100 cc Isovue-300 was admin
istered intravenously. Oral contrast was not requested which limits evaluation of bowel and appendix

 

All CT scans are performed using dose optimization technique as appropriate and may include automated
 exposure control or mA/KV adjustment according to patient size.

 

FINDINGS:  The liver, spleen, pancreas, adrenal and kidneys appear unremarkable.

 

There is no evidence of diverticulitis.

 

Hysterectomy. No adnexal masses

 

IMPRESSION:  No acute abnormality is displayed.

## 2023-05-24 NOTE — ER
Nurse's Notes                                                                                     

 [facility]                                                                 

Name: Mala Pascal                                                                                

Age: 62 yrs                                                                                       

Sex: Female                                                                                       

: 1960                                                                                   

MRN: [id number]                                                                                   

Arrival Date: 2023                                                                          

Time: 16:10                                                                                       

Account#: [id number]                                                                            

Bed 14                                                                                            

Private MD:                                                                                       

Diagnosis: Melena                                                                                 

                                                                                                  

Presentation:                                                                                     

                                                                                             

16:39 Chief complaint: Patient states: pain in abdomen and into left side, black stools,      iw  

      fever, vomiting, started last Thursday. Coronavirus screen: At this time, the client        

      does not indicate any symptoms associated with coronavirus-19. Ebola Screen: Patient        

      negative for fever greater than or equal to 101.5 degrees Fahrenheit, and additional        

      compatible Ebola Virus Disease symptoms Patient denies exposure to infectious person.       

      Patient denies travel to an Ebola-affected area in the 21 days before illness onset. No     

      symptoms or risks identified at this time. Initial Sepsis Screen: Does the patient meet     

      any 2 criteria? No. Patient's initial sepsis screen is negative. Does the patient have      

      a suspected source of infection? No. Patient's initial sepsis screen is negative. Risk      

      Assessment: Do you want to hurt yourself or someone else? Patient reports no desire to      

      harm self or others. Onset of symptoms was May 18, 2023.                                    

16:39 Method Of Arrival: Ambulatory                                                           iw  

16:39 Acuity: APRIL 3                                                                           iw  

                                                                                                  

Triage Assessment:                                                                                

19:00 General: Appears in no apparent distress. comfortable, Behavior is calm, cooperative,   nj1 

      appropriate for age.                                                                        

19:00 Pain: Complains of pain in abdomen Pain currently is 8 out of 10 on a pain scale.       nj1 

      Quality of pain is described as stabbing. Neuro: Level of Consciousness is awake,           

      alert, obeys commands, Oriented to person, place, time, situation. Cardiovascular:          

      Patient's skin is warm and dry. Respiratory: Airway is patent Respiratory effort is         

      even, unlabored. GI: Reports upper abdominal pain, Patient currently denies                 

      indigestion, nausea, vomiting.                                                              

                                                                                                  

Historical:                                                                                       

- Allergies:                                                                                      

16:40 Hydrocodone-Acetaminophen; vomiting;                                                    iw  

- Home Meds:                                                                                      

16:40 Trelegy Ellipta inhalation [Active];                                                    iw  

- PMHx:                                                                                           

16:40 COPD;                                                                                   iw  

- PSHx:                                                                                           

16:40 neck fusion; hysterectomy;  section;                                            iw  

                                                                                                  

- Immunization history:: Adult Immunizations not immunized, Client reports receiving              

  the 2nd dose of the Covid vaccine.                                                              

- Social history:: Smoking status: Smoking status: Patient reports the use of cigarette           

  tobacco products, smokes one pack cigarettes per day.                                           

                                                                                                  

                                                                                                  

Screenin:00 [facility] ED Fall Risk Assessment (Adult) History of falling in the last 3 months,       nj1 

      including since admission No falls in past 3 months (0 pts) Confusion or Disorientation     

      No (0 pts) Intoxicated or Sedated No (0 pts) Impaired Gait No (0 pts) Mobility Assist       

      Device Used No (0 pt) Altered Elimination No (0 pt) Score/Fall Risk Level 0 - 2 = Low       

      Risk Oriented to surroundings, Maintained a safe environment, Hourly rounding (assess       

      needs \T\ fall precautionary measures) done.                                                

19:00 Abuse screen: Denies threats or abuse. Denies injuries from another. Nutritional        [facility] 

      screening: No deficits noted. Tuberculosis screening: No symptoms or risk factors           

      identified.                                                                                 

                                                                                                  

Assessment:                                                                                       

17:52 Reassessment: Patient not in room at this time.                                         nj1 

19:55 Reassessment: Patient appears in no apparent distress at this time. Patient and/or      nj1 

      family updated on plan of care and expected duration. Pain level reassessed. Patient is     

      alert, oriented x 3, equal unlabored respirations, skin warm/dry/pink. Pain: Complains      

      of pain in abdomen.                                                                         

19:55 Pain: Pain currently is 8 out of 10 on a pain scale. Quality of pain is described as    nj1 

      stabbing.                                                                                   

21:00 Reassessment: Patient appears in no apparent distress at this time. No changes from     [facility] 

      previously documented assessment.                                                           

                                                                                                  

Vital Signs:                                                                                      

16:39 BP 97 / 54; Pulse 74; Resp 16; Temp 98.4; Weight 42.18 kg; Height 5 ft. 2 in. ; Pain    iw  

      8/10;                                                                                       

17:37  / 54; Pulse 80; Resp 16; Temp 98.2; Pulse Ox 99% on R/A;                         rs5 

18:30  / 62; Pulse 66; Resp 17; Pulse Ox 100% on R/A;                                   nj1 

19:30  / 64; Pulse 65; Resp 20; Pulse Ox 98% on R/A; Pain 8/10;                         nj1 

16:39 Body Mass Index 17.01 (42.18 kg, 157.48 cm)                                             iw  

16:39 Pain Scale: Adult                                                                       iw  

19:30 Pain Scale: Adult                                                                       nj1 

                                                                                                  

ED Course:                                                                                        

16:12 Patient arrived in ED.                                                                  mr  

16:19 Joe Mercer MD is Attending Physician.                                              bs3 

16:40 Triage completed.                                                                       iw  

16:42 Arm band placed on.                                                                     iw  

16:56 Radiology exam delayed due to lab results not completed at this time. (BUN/Creatinine)  [id number]

      IV insertion attempt and/or patient not having appropriate IV at this time.                 

17:35 Jamila Friedman, RN is Primary Nurse.                                                       nj1 

17:50 CT Abd/Pelvis - IV Contrast Only In Process Unspecified.                                EDMS

18:52 Attending Physician role handed off by Joe Mercer MD                               rt  

18:52 Kwesi Hernández MD is Attending Physician.                                            rt  

18:52 Jules Mendoza MD is Referral Physician.                                              rt  

18:53 Jules Mendoza MD is Hospitalizing Provider.                                          rt  

19:00 Patient has correct armband on for positive identification. Bed in low position. Call   nj1 

      light in reach. Adult w/ patient.                                                           

                                                                                                  

Administered Medications:                                                                         

18:20 Drug: NS 0.9%  ml Route: IV; Rate: bolus; Site: right antecubital;                vg1 

19:30 Follow up: Response: No adverse reaction; IV Status: Completed infusion; IV Intake:     nj1 

      500ml                                                                                       

18:22 Drug: Pantoprazole IVP 40 mg Route: IVP; Site: right antecubital;                       vg1 

19:57 Follow up: Response: No adverse reaction                                                nj1 

18:23 Drug: Ondansetron IVP 4 mg Route: IVP; Site: right antecubital;                         vg1 

19:57 Follow up: Response: No adverse reaction                                                nj1 

18:25 Drug: Sucralfate PO 1 grams Route: PO;                                                  vg1 

19:57 Follow up: Response: No adverse reaction                                                nj1 

21:35 Drug: Nicoderm CQ Transdermal Patch 21 mg/24 hr 21 mg {Note: Right upper back.} Route:  nj1 

      Transdermal; Site: affected area;                                                           

                                                                                                  

                                                                                                  

Intake:                                                                                           

19:30 IV: 500ml; Total: 500ml.                                                                nj1 

                                                                                                  

Outcome:                                                                                          

18:53 Discharge ordered by MD.                                                                rt  

18:53 Decision to Hospitalize by Provider.                                                    rt  

21:40 Patient left the ED.                                                                    ha1 

                                                                                                  

Signatures:                                                                                       

Dispatcher [employer]                           [facility]                                                 

GannonMala                                 mr                                                   

Oanh Enrique RN RN                                                      

Re Corrales RN                    RN   [facility]                                                  

Martha Carvajal RN                        RN   ha1                                                  

Joe Mercer MD MD   bs3                                                  

Shahnaz Hamilton                               [id number]                                                 

Kwesi Hernández MD MD   rt                                                   

Clement Vega                               rs5                                                  

Jamila Friedman RN                         RN   nj1                                                  

                                                                                                  

Corrections: (The following items were deleted from the chart)                                    

16:42 16:40 Allergies: No Known Allergies; iw                                                 iw  

                                                                                                  

**************************************************************************************************

## 2023-05-24 NOTE — EDPHYS
Physician Documentation                                                                           

 [facility]                                                                 

Name: Mala Pascal                                                                                

Age: 62 yrs                                                                                       

Sex: Female                                                                                       

: 1960                                                                                   

MRN: [id number]                                                                                   

Arrival Date: 2023                                                                          

Time: 16:10                                                                                       

Account#: [id number]                                                                            

Bed 14                                                                                            

Private MD:                                                                                       

ED Physician Kwesi Hernández                                                                     

HPI:                                                                                              

                                                                                             

16:29 This 62 yrs old  Female presents to ER via Unassigned with complaints of       bs3 

      Abdominal Pain.                                                                             

16:29 61 yo female history of COPD current smoker presents with black stools and abdominal    bs3 

      pain for approximately 4 to 5 days she notes that last week she had fevers and went to      

      an urgent care they gave her ceftriaxone and an antibiotic for urinary tract infection      

      but then she still had symptoms and therefore she came in denies any chest pain or          

      shortness of breath she notes that the pain does take her breath away, she has never        

      had this before she had vomiting last week which was initially clear but then she had       

      black stools approximately 2/day for the last several days. She does take Advil             

      approximately 4 pills weekly for chronic arthritis.                                         

                                                                                                  

Historical:                                                                                       

- Allergies:                                                                                      

16:40 Hydrocodone-Acetaminophen; vomiting;                                                    iw  

- Home Meds:                                                                                      

16:40 Trelegy Ellipta inhalation [Active];                                                    iw  

- PMHx:                                                                                           

16:40 COPD;                                                                                   iw  

- PSHx:                                                                                           

16:40 neck fusion; hysterectomy;  section;                                            iw  

                                                                                                  

- Immunization history:: Adult Immunizations not immunized, Client reports receiving              

  the 2nd dose of the Covid vaccine.                                                              

- Social history:: Smoking status: Smoking status: Patient reports the use of cigarette           

  tobacco products, smokes one pack cigarettes per day.                                           

                                                                                                  

                                                                                                  

ROS:                                                                                              

16:29 Constitutional: Negative for fever, chills                                              bs3 

16:29 All other systems are negative.                                                             

                                                                                                  

Exam:                                                                                             

16:29 Constitutional:  This is a well developed, well nourished patient who is awake, alert,  bs3 

      and in no acute distress. Head/Face:  Normocephalic, atraumatic. Eyes:  Pupils equal        

      round and reactive to light, extra-ocular motions intact.  Lids and lashes normal.          

      ENT:  mmm, no posterior phyarngeal erythema Neck:  Trachea midline, no thyromegaly, no      

      neck stiffness Chest/axilla:  Normal chest wall appearance and motion.  Nontender with      

      no deformity.  No lesions are appreciated. Cardiovascular:  Regular rate and rhythm         

      with a normal S1 and S2.  symmetric pulses in upper extremities Respiratory:  Lungs         

      have equal breath sounds bilaterally, clear to auscultation, no respiratory distress        

      Abdomen/GI:  Tender in left upper quadrant no peritoneal signs, no guarding MS/             

      Extremity:  Pulses equal, no cyanosis.  Neurovascular intact.  Full, normal range of        

      motion. Neuro:  Awake and alert, GCS 15, oriented to person, place, time, and               

      situation.  Cranial nerves II-XII grossly intact.  Motor strength 5/5 in all                

      extremities.  Sensory grossly intact.   Psych:  Awake, alert, with orientation to           

      person, place and time.  Behavior, mood, and affect are within normal limits.               

                                                                                                  

Vital Signs:                                                                                      

16:39 BP 97 / 54; Pulse 74; Resp 16; Temp 98.4; Weight 42.18 kg; Height 5 ft. 2 in. ; Pain    iw  

      8/10;                                                                                       

17:37  / 54; Pulse 80; Resp 16; Temp 98.2; Pulse Ox 99% on R/A;                         rs5 

18:30  / 62; Pulse 66; Resp 17; Pulse Ox 100% on R/A;                                   nj1 

19:30  / 64; Pulse 65; Resp 20; Pulse Ox 98% on R/A; Pain 8/10;                         nj1 

16:39 Body Mass Index 17.01 (42.18 kg, 157.48 cm)                                             iw  

16:39 Pain Scale: Adult                                                                       iw  

19:30 Pain Scale: Adult                                                                       nj1 

                                                                                                  

MDM:                                                                                              

16:19 Patient medically screened.                                                             bs3 

16:29 Data reviewed: vital signs, nurses notes. ED course: Possible pancreatitis possible     bs3 

      colitis possible upper GI bleed possibly related to peptic ulcer disease or gastritis       

      given her NSAID use we will check labs we will do CT given her tenderness I discussed       

      the case with Dr. Thomas who recommended admission to the hospitalist he will see the        

      patient as an inpatient will consider transfusion.                                          

18:00 ED course: labs normal, pending, CT, plan for admission to hospitalist for further      bs3 

      management and workup with consult to Dr. Thomas.                                            

19:35 Differential diagnosis: Sepsis, viral syndrome.                                         rt  

                                                                                                  

                                                                                             

16:21 Order name: CBC with Diff; Complete Time: 17:04                                         bs3 

                                                                                             

16:21 Order name: CMP; Complete Time: 17:41                                                   bs3 

                                                                                             

16:21 Order name: Lipase; Complete Time: 17:41                                                bs3 

05/24                                                                                             

16:21 Order name: Type And Screen; Complete Time: 18:19                                       bs3 

                                                                                             

16:21 Order name: CT Abd/Pelvis - IV Contrast Only; Complete Time: 18:19                      bs3 

                                                                                             

16:21 Order name: IV Saline Lock; Complete Time: 16:50                                        bs3 

                                                                                             

16:21 Order name: Labs collected and sent; Complete Time: 16:50                               bs3 

                                                                                             

16:28 Order name: EKG - Nurse/Tech; Complete Time: 18:26                                      bs3 

                                                                                                  

Administered Medications:                                                                         

18:20 Drug: NS 0.9%  ml Route: IV; Rate: bolus; Site: right antecubital;                vg1 

19:30 Follow up: Response: No adverse reaction; IV Status: Completed infusion; IV Intake:     nj1 

      500ml                                                                                       

18:22 Drug: Pantoprazole IVP 40 mg Route: IVP; Site: right antecubital;                       vg1 

19:57 Follow up: Response: No adverse reaction                                                nj1 

18:23 Drug: Ondansetron IVP 4 mg Route: IVP; Site: right antecubital;                         vg1 

19:57 Follow up: Response: No adverse reaction                                                nj1 

18:25 Drug: Sucralfate PO 1 grams Route: PO;                                                  vg1 

19:57 Follow up: Response: No adverse reaction                                                nj1 

21:35 Drug: Nicoderm CQ Transdermal Patch 21 mg/24 hr 21 mg {Note: Right upper back.} Route:  nj1 

      Transdermal; Site: affected area;                                                           

                                                                                                  

                                                                                                  

Disposition Summary:                                                                              

23 18:53                                                                                    

Hospitalization Ordered                                                                           

      Hospitalization Status: Observation                                                     rt  

      Provider: Jules Mendoza                                                               rt  

      Location: Telemetry/MedSurg (observation)(23 18:53)                               rt  

      Condition: Stable(23 18:53)                                                       rt  

      Problem: new(23 18:53)                                                            rt  

      Symptoms: are unchanged(23 18:53)                                                 rt  

      Bed/Room Type: Standard                                                                 rt  

      Room Assignment: [facility](23 20:46)                                                    [facility] 

      Diagnosis                                                                                   

        - Melena(23 18:53)                                                              rt  

      Forms:                                                                                      

        - Medication Reconciliation Form                                                      rt  

        - SBAR form                                                                           rt  

Signatures:                                                                                       

Dispatcher [employer]                           Oanh Perez RN RN   iw                                                   

Sha Alcocer, THERESAP-C                      FNP-Cla1                                                  

Anne, Hafsa, RN                     RN   eb1                                                  

Antoni, Re, RN                    RN   vg1                                                  

Joe Mercer MD MD   bs3                                                  

Kwesi Hernández MD MD   rt                                                   

Jamila Friedman, RN                         RN   nj1                                                  

                                                                                                  

Corrections: (The following items were deleted from the chart)                                    

16:42 16:40 Allergies: No Known Allergies; iw                                                 iw  

18:53 18:53 Home rt                                                                           rt  

18:53 18:53 new rt                                                                            rt  

18:53 18:53 are unchanged rt                                                                  rt  

18:53 18:53 Stable rt                                                                         rt  

18:53 18:53 Melena rt                                                                         rt  

20:46 18:53 rt                                                                                eb1 

                                                                                                  

**************************************************************************************************

## 2023-05-24 NOTE — P.HP
Certification for Inpatient


Patient admitted to: Observation


With expected LOS: <2 Midnights


Patient will require the following post-hospital care: None


Practitioner: I am a practitioner with admitting privileges, knowledge of 

patient current condition, hospital course, and medical plan of care.


Services: Services provided to patient in accordance with Admission requirements

found in Title 42 Section 412.3 of the Code of Federal Regulations





Patient History


Date of Service: 05/24/23


Reason for admission: Melena


History of Present Illness: 





62-year-old female with history of COPD presents emergency department with chief

complaint of melena, left-sided abdominal pain.  She reports the abdominal pain 

started on Thursday, developed melena on Saturday reports approximately 2 dark 

tarry stools per day, last episode of melena was last night.  She reports a 

recent illness with fevers for which she was taking NSAIDs regularly for.  Labs 

are unremarkable CT of the abdomen pelvis negative for acute findings.  ED 

provider wishes to admit under observation for melena.  Case was discussed with 

patient's GI doctor Dr. Zheng who will see her at hospital.





- Past Medical/Surgical History


-: COPD


-: Cervical fusion


-: Hysterectomy


Psychosocial/ Personal History: Patient lives at home with family





- Family History


Family History: Reviewed- Non-Contributory





- Social History


Smoking Status: Current every day smoker


Counseled patient to stop smoking for: less than 10 minutes


Smoking therapy provided: Yes


Alcohol use: No


CD- Drugs: No


Caffeine use: Yes


Place of Residence: Home





Review of Systems


10-point ROS is otherwise unremarkable


Gastrointestinal: Nausea, Abdominal Pain, Melena





Physical Examination





- Physical Exam


General: Alert, In no apparent distress, Oriented x3


HEENT: Atraumatic, PERRLA, Mucous membr. moist/pink, EOMI, Sclerae nonicteric


Neck: Supple, 2+ carotid pulse no bruit, No LAD, Without JVD or thyroid 

abnormality


Respiratory: Clear to auscultation bilaterally, Normal air movement


Cardiovascular: Regular rate/rhythm, Normal S1 S2


Gastrointestinal: Normal bowel sounds, No tenderness


Musculoskeletal: No tenderness


Integumentary: No rashes


Neurological: Normal gait, Normal speech, Normal strength at 5/5 x4 extr, Normal

tone, Normal affect


Lymphatics: No axilla or inguinal lymphadenopathy





- Studies


Laboratory Data (last 24 hrs)





05/24/23 16:48: Sodium 140, Potassium 3.9, BUN 18, Creatinine 0.88, Glucose 94, 

Total Bilirubin 0.4, AST 15, ALT 23, Alkaline Phosphatase 61, Lipase 77 H


05/24/23 16:48: WBC 6.20, Hgb 13.3, Hct 38.9, Plt Count 418 H








Assessment and Plan





- Plan


Assessment:


Melena


COPD





Plan:


Melena


IV PPI, n.p.o. after midnight, repeat hemoglobin tonight, GI consult.  Suspect 

this is related to NSAID use/gastritis or gastric ulcer.


COPD


Continue home meds, as needed nebulizer treatments.





DVT PPX: SCD


Code status: Full


Discharge Plan: Home


Plan to discharge in: 24 Hours





- Advance Directives


Does patient have a Living Will: No


Does patient have a Durable POA for Healthcare: No





- Code Status/Comfort Care


Code Status Assessed: Yes (Full code)


Critical Care: No


Time Spent Managing Pts Care (In Minutes): 55

## 2023-05-25 LAB
BUN BLD-MCNC: 14 MG/DL (ref 7–18)
GLUCOSE SERPLBLD-MCNC: 100 MG/DL (ref 74–106)
HCT VFR BLD CALC: 34.4 % (ref 36–45)
LYMPHOCYTES # SPEC AUTO: 1.1 K/UL (ref 0.7–4.9)
MCV RBC: 93.9 FL (ref 80–100)
PMV BLD: 6.2 FL (ref 7.6–11.3)
POTASSIUM SERPL-SCNC: 4 MEQ/L (ref 3.5–5.1)
RBC # BLD: 3.66 M/UL (ref 3.86–4.86)

## 2023-05-25 PROCEDURE — 0DB68ZX EXCISION OF STOMACH, VIA NATURAL OR ARTIFICIAL OPENING ENDOSCOPIC, DIAGNOSTIC: ICD-10-PCS

## 2023-05-25 PROCEDURE — 0DB78ZX EXCISION OF STOMACH, PYLORUS, VIA NATURAL OR ARTIFICIAL OPENING ENDOSCOPIC, DIAGNOSTIC: ICD-10-PCS

## 2023-05-25 PROCEDURE — 0DBP8ZX EXCISION OF RECTUM, VIA NATURAL OR ARTIFICIAL OPENING ENDOSCOPIC, DIAGNOSTIC: ICD-10-PCS

## 2023-05-25 PROCEDURE — 0DBM8ZX EXCISION OF DESCENDING COLON, VIA NATURAL OR ARTIFICIAL OPENING ENDOSCOPIC, DIAGNOSTIC: ICD-10-PCS

## 2023-05-25 PROCEDURE — 0DBB8ZX EXCISION OF ILEUM, VIA NATURAL OR ARTIFICIAL OPENING ENDOSCOPIC, DIAGNOSTIC: ICD-10-PCS

## 2023-05-25 RX ADMIN — SODIUM CHLORIDE SCH MLS: 0.9 INJECTION, SOLUTION INTRAVENOUS at 04:21

## 2023-05-25 RX ADMIN — SODIUM CHLORIDE SCH: 0.9 INJECTION, SOLUTION INTRAVENOUS at 08:50

## 2023-05-25 RX ADMIN — METOCLOPRAMIDE SCH MG: 5 INJECTION, SOLUTION INTRAMUSCULAR; INTRAVENOUS at 20:59

## 2023-05-25 RX ADMIN — SODIUM CHLORIDE SCH MG: 0.9 INJECTION, SOLUTION INTRAVENOUS at 08:50

## 2023-05-25 RX ADMIN — SUCRALFATE SCH GM: 1 TABLET ORAL at 20:59

## 2023-05-25 RX ADMIN — Medication SCH: at 21:00

## 2023-05-25 RX ADMIN — SUCRALFATE SCH GM: 1 TABLET ORAL at 16:42

## 2023-05-25 RX ADMIN — Medication SCH ML: at 21:00

## 2023-05-25 RX ADMIN — Medication SCH ML: at 20:59

## 2023-05-25 RX ADMIN — METOCLOPRAMIDE SCH MG: 5 INJECTION, SOLUTION INTRAMUSCULAR; INTRAVENOUS at 15:10

## 2023-05-25 RX ADMIN — Medication SCH ML: at 08:49

## 2023-05-25 RX ADMIN — SODIUM CHLORIDE SCH MG: 0.9 INJECTION, SOLUTION INTRAVENOUS at 21:00

## 2023-05-25 RX ADMIN — SUCRALFATE SCH: 1 TABLET ORAL at 08:50

## 2023-05-25 RX ADMIN — SUCRALFATE SCH: 1 TABLET ORAL at 11:13

## 2023-05-25 NOTE — EKG
Test Date:    2023-05-24               Test Time:    18:16:54

Technician:   VIRY                                     

                                                     

MEASUREMENT RESULTS:                                       

Intervals:                                           

Rate:         68                                     

WI:           156                                    

QRSD:         80                                     

QT:           410                                    

QTc:          435                                    

Axis:                                                

P:            84                                     

WI:           156                                    

QRS:          42                                     

T:            69                                     

                                                     

INTERPRETIVE STATEMENTS:                                       

                                                     

Normal sinus rhythm

Cannot rule out Anterior infarct, age undetermined

Abnormal ECG

No previous ECG available for comparison



Electronically Signed On 05-25-23 12:31:42 CDT by Bennie Cornelius

## 2023-05-25 NOTE — P.PN
Subjective


Date of Service: 05/25/23


Chief Complaint: Melena


Subjective: No new changes, Improving





Physical Examination





- Vital Signs


Temperature: 97.1 F


Blood Pressure: 105/54


Pulse: 61


Respirations: 18


Pulse Ox (%): 96





- Physical Exam


General: Alert, Oriented x3


HEENT: Atraumatic, Normocephalic


Neck: Supple


Respiratory: Normal air movement


Cardiovascular: Regular rate/rhythm, Normal S1 S2


Gastrointestinal: Soft and benign


Musculoskeletal: No swelling


Neurological: Normal speech





- Studies


Laboratory Data (last 24 hrs)





05/24/23 16:48: Sodium 140, Potassium 3.9, BUN 18, Creatinine 0.88, Glucose 94, 

Total Bilirubin 0.4, AST 15, ALT 23, Alkaline Phosphatase 61, Lipase 77 H


05/24/23 16:48: WBC 6.20, Hgb 13.3, Hct 38.9, Plt Count 418 H








Assessment And Plan





- Plan


Peptic ulcer disease:


Patient does have melena stools and there is concern for peptic ulcer from NSAID

use among other issues.


She is n.p.o. for EGD today.


Continue PPI therapy.


Follow GI recommendations postprocedure.








History of COPD: No active issues.  Will monitor closely.  Continue medication.








Disposition: Pending endoscopy.

## 2023-05-26 VITALS — SYSTOLIC BLOOD PRESSURE: 115 MMHG | TEMPERATURE: 97.6 F | DIASTOLIC BLOOD PRESSURE: 57 MMHG

## 2023-05-26 VITALS — OXYGEN SATURATION: 95 %

## 2023-05-26 LAB
BUN BLD-MCNC: 5 MG/DL (ref 7–18)
GLUCOSE SERPLBLD-MCNC: 90 MG/DL (ref 74–106)
HCT VFR BLD CALC: 32.9 % (ref 36–45)
LYMPHOCYTES # SPEC AUTO: 1 K/UL (ref 0.7–4.9)
MCV RBC: 93.2 FL (ref 80–100)
PMV BLD: 6.1 FL (ref 7.6–11.3)
POTASSIUM SERPL-SCNC: 3.5 MEQ/L (ref 3.5–5.1)
RBC # BLD: 3.53 M/UL (ref 3.86–4.86)

## 2023-05-26 RX ADMIN — SUCRALFATE SCH GM: 1 TABLET ORAL at 17:58

## 2023-05-26 RX ADMIN — SODIUM CHLORIDE SCH: 0.9 INJECTION, SOLUTION INTRAVENOUS at 15:00

## 2023-05-26 RX ADMIN — Medication SCH ML: at 09:00

## 2023-05-26 RX ADMIN — SUCRALFATE SCH: 1 TABLET ORAL at 11:02

## 2023-05-26 RX ADMIN — METOCLOPRAMIDE SCH MG: 5 INJECTION, SOLUTION INTRAMUSCULAR; INTRAVENOUS at 02:52

## 2023-05-26 RX ADMIN — Medication SCH: at 07:33

## 2023-05-26 RX ADMIN — SODIUM CHLORIDE SCH MG: 0.9 INJECTION, SOLUTION INTRAVENOUS at 09:05

## 2023-05-26 RX ADMIN — SODIUM CHLORIDE SCH: 0.9 INJECTION, SOLUTION INTRAVENOUS at 05:00

## 2023-05-26 RX ADMIN — SUCRALFATE SCH: 1 TABLET ORAL at 07:30

## 2023-05-26 RX ADMIN — SODIUM CHLORIDE SCH MLS: 0.9 INJECTION, SOLUTION INTRAVENOUS at 03:37

## 2023-05-26 NOTE — RAD REPORT
EXAM DESCRIPTION:  MRI - MRA Abdomen W/Wo Cont - 5/26/2023 8:27 am

 

CLINICAL HISTORY:  Abdominal pain

 

COMPARISON:  CT May 24, 2023

 

TECHNIQUE:  Magnetic resonance angiogram of the aorta and splanchnic vessels. 3D MIPS reconstruction 
performed. 20 cc MultiHance administered intravenously

 

FINDINGS:  The celiac and superior mesenteric arteries are normal caliber.

 

Abdominal aorta unremarkable

 

Minimal plaque iliac arteries

 

Mild to moderate narrowing of proximal left renal artery.

 

IMPRESSION:  Celiac and superior mesenteric arteries are normal caliber

## 2023-05-26 NOTE — RAD REPORT
EXAM DESCRIPTION:  RAD - Small Bowel Series - 5/26/2023 5:45 pm

 

CLINICAL HISTORY:  occult GI bleeding / melena

 

COMPARISON:  Abdomen   Pelvis W Contrast dated 5/24/2023

 

TECHNIQUE:  Serial AP views of the abdomen were obtained for studying the small bowel following oral 
administration of barium contrast.

 

FINDINGS:   film shows a nonspecific bowel gas pattern. No obstruction or free air. Moderate gas
eous distention of the proximal colon on the  images. No suspicious calcifications.

 

Gastric size and mucosal fold pattern are normal. No delay in transit of contrast into the small jose
l. Small bowel is normal in diameter with no mucosal fold thickening. No abnormal filling defects. No
 intrinsic or extrinsic mass identifiable. Terminal ileum has a normal appearance. Transit time to th
e colon is normal 1 hour.

 

IMPRESSION:  Normal small bowel series.